# Patient Record
Sex: FEMALE | Race: ASIAN | NOT HISPANIC OR LATINO
[De-identification: names, ages, dates, MRNs, and addresses within clinical notes are randomized per-mention and may not be internally consistent; named-entity substitution may affect disease eponyms.]

---

## 2018-02-21 ENCOUNTER — CHARTING TRANS (OUTPATIENT)
Dept: OTHER | Age: 28
End: 2018-02-21

## 2018-02-21 ENCOUNTER — LAB SERVICES (OUTPATIENT)
Dept: OTHER | Age: 28
End: 2018-02-21

## 2018-02-21 LAB
HCG SERPL QL: POSITIVE
MONOCLONAL PREGNANCY: POSITIVE

## 2018-10-01 ENCOUNTER — CHARTING TRANS (OUTPATIENT)
Dept: OTHER | Age: 28
End: 2018-10-01

## 2018-10-02 ENCOUNTER — LAB SERVICES (OUTPATIENT)
Dept: OTHER | Age: 28
End: 2018-10-02

## 2018-10-02 ENCOUNTER — CHARTING TRANS (OUTPATIENT)
Dept: OTHER | Age: 28
End: 2018-10-02

## 2018-10-02 LAB
ALBUMIN SERPL-MCNC: 4 G/DL (ref 3.6–5.1)
ALBUMIN/GLOB SERPL: 1.3 (ref 1–2.4)
ALP SERPL-CCNC: 68 UNITS/L (ref 45–117)
ALT SERPL-CCNC: 17 UNITS/L
ANION GAP SERPL CALC-SCNC: 13 MMOL/L (ref 10–20)
AST SERPL-CCNC: 13 UNITS/L
BASOPHILS # BLD: 0 K/MCL (ref 0–0.3)
BASOPHILS NFR BLD: 0 %
BILIRUB SERPL-MCNC: 0.8 MG/DL (ref 0.2–1)
BUN SERPL-MCNC: 7 MG/DL (ref 6–20)
BUN/CREAT SERPL: 13 (ref 7–25)
CALCIUM SERPL-MCNC: 9.1 MG/DL (ref 8.4–10.2)
CHLORIDE SERPL-SCNC: 107 MMOL/L (ref 98–107)
CHOLEST SERPL-MCNC: 143 MG/DL
CHOLEST/HDLC SERPL: 2.7
CO2 SERPL-SCNC: 25 MMOL/L (ref 21–32)
CREAT SERPL-MCNC: 0.55 MG/DL (ref 0.51–0.95)
DIFFERENTIAL METHOD BLD: ABNORMAL
EOSINOPHIL # BLD: 0.1 K/MCL (ref 0.1–0.5)
EOSINOPHIL NFR BLD: 1 %
ERYTHROCYTE [DISTWIDTH] IN BLOOD: 12.8 % (ref 11–15)
GLOBULIN SER-MCNC: 3 G/DL (ref 2–4)
GLUCOSE SERPL-MCNC: 81 MG/DL (ref 65–99)
HDLC SERPL-MCNC: 53 MG/DL
HEMATOCRIT: 36.6 % (ref 36–46.5)
HEMOGLOBIN: 11.7 G/DL (ref 12–15.5)
IMM GRANULOCYTES # BLD AUTO: 0 K/MCL (ref 0–0.2)
IMM GRANULOCYTES NFR BLD: 0 %
LDLC SERPL CALC-MCNC: 80 MG/DL
LENGTH OF FAST TIME PATIENT: ABNORMAL HRS
LENGTH OF FAST TIME PATIENT: ABNORMAL HRS
LYMPHOCYTES # BLD: 1.3 K/MCL (ref 1–4.8)
LYMPHOCYTES NFR BLD: 27 %
MEAN CORPUSCULAR HEMOGLOBIN: 27.7 PG (ref 26–34)
MEAN CORPUSCULAR HGB CONC: 32 G/DL (ref 32–36.5)
MEAN CORPUSCULAR VOLUME: 86.7 FL (ref 78–100)
MONOCYTES # BLD: 0.5 K/MCL (ref 0.3–0.9)
MONOCYTES NFR BLD: 10 %
NEUTROPHILS # BLD: 3 K/MCL (ref 1.8–7.7)
NEUTROPHILS NFR BLD: 62 %
NONHDLC SERPL-MCNC: 90 MG/DL
NRBC (NRBCRE): 0 /100 WBC
PLATELET COUNT: 222 K/MCL (ref 140–450)
POTASSIUM SERPL-SCNC: 3.7 MMOL/L (ref 3.4–5.1)
RED CELL COUNT: 4.22 MIL/MCL (ref 4–5.2)
SODIUM SERPL-SCNC: 141 MMOL/L (ref 135–145)
TOTAL PROTEIN: 7 G/DL (ref 6.4–8.2)
TRIGL SERPL-MCNC: 50 MG/DL
WHITE BLOOD COUNT: 4.8 K/MCL (ref 4.2–11)

## 2018-10-03 LAB
APPEARANCE UR: CLEAR
BILIRUB UR QL: NEGATIVE
COLOR UR: YELLOW
GLUCOSE UR-MCNC: NEGATIVE MG/DL
KETONES UR-MCNC: NEGATIVE MG/DL
NITRITE UR QL: NEGATIVE
PH UR: 7 UNITS (ref 5–7)
PROT UR QL: NEGATIVE MG/DL
RBC-URINE: NEGATIVE
SP GR UR: 1.01 (ref 1–1.03)
SPECIMEN SOURCE: NORMAL
UROBILINOGEN UR QL: 0.2 MG/DL (ref 0–1)
WBC-URINE: NEGATIVE

## 2018-11-01 VITALS
SYSTOLIC BLOOD PRESSURE: 121 MMHG | HEIGHT: 61 IN | TEMPERATURE: 97.7 F | DIASTOLIC BLOOD PRESSURE: 75 MMHG | HEART RATE: 70 BPM | OXYGEN SATURATION: 100 % | WEIGHT: 114.64 LBS | BODY MASS INDEX: 21.64 KG/M2 | RESPIRATION RATE: 16 BRPM

## 2018-11-21 ENCOUNTER — CHARTING TRANS (OUTPATIENT)
Dept: OTHER | Age: 28
End: 2018-11-21

## 2018-11-27 VITALS
DIASTOLIC BLOOD PRESSURE: 58 MMHG | WEIGHT: 114.06 LBS | HEART RATE: 64 BPM | HEIGHT: 61 IN | BODY MASS INDEX: 21.54 KG/M2 | TEMPERATURE: 98.6 F | OXYGEN SATURATION: 98 % | SYSTOLIC BLOOD PRESSURE: 116 MMHG

## 2018-12-07 VITALS
OXYGEN SATURATION: 99 % | DIASTOLIC BLOOD PRESSURE: 72 MMHG | HEART RATE: 86 BPM | RESPIRATION RATE: 18 BRPM | WEIGHT: 114 LBS | TEMPERATURE: 98.5 F | SYSTOLIC BLOOD PRESSURE: 105 MMHG | HEIGHT: 60 IN | BODY MASS INDEX: 22.38 KG/M2

## 2019-12-20 ENCOUNTER — OFFICE VISIT (OUTPATIENT)
Dept: FAMILY MEDICINE | Age: 29
End: 2019-12-20

## 2019-12-20 DIAGNOSIS — Z78.9 VEGETARIAN DIET: ICD-10-CM

## 2019-12-20 DIAGNOSIS — Z00.00 ROUTINE HEALTH MAINTENANCE: Primary | ICD-10-CM

## 2019-12-20 DIAGNOSIS — Z12.4 CERVICAL CANCER SCREENING: ICD-10-CM

## 2019-12-20 PROBLEM — K21.9 GERD (GASTROESOPHAGEAL REFLUX DISEASE): Status: ACTIVE | Noted: 2018-10-01

## 2019-12-20 LAB
25(OH)D3+25(OH)D2 SERPL-MCNC: 12.6 NG/ML (ref 30–100)
ALBUMIN SERPL-MCNC: 4 G/DL (ref 3.6–5.1)
ALBUMIN/GLOB SERPL: 1.3 {RATIO} (ref 1–2.4)
ALP SERPL-CCNC: 66 UNITS/L (ref 45–117)
ALT SERPL-CCNC: 16 UNITS/L
ANION GAP SERPL CALC-SCNC: 10 MMOL/L (ref 10–20)
APPEARANCE UR: CLEAR
AST SERPL-CCNC: 15 UNITS/L
BACTERIA #/AREA URNS HPF: ABNORMAL /HPF
BASOPHILS # BLD AUTO: 0 K/MCL (ref 0–0.3)
BASOPHILS NFR BLD AUTO: 0 %
BILIRUB SERPL-MCNC: 0.6 MG/DL (ref 0.2–1)
BILIRUB UR QL STRIP: NEGATIVE
BUN SERPL-MCNC: 11 MG/DL (ref 6–20)
BUN/CREAT SERPL: 24 (ref 7–25)
CALCIUM SERPL-MCNC: 8.6 MG/DL (ref 8.4–10.2)
CHLORIDE SERPL-SCNC: 109 MMOL/L (ref 98–107)
CHOLEST SERPL-MCNC: 153 MG/DL
CHOLEST/HDLC SERPL: 2.5 {RATIO}
CO2 SERPL-SCNC: 26 MMOL/L (ref 21–32)
COLOR UR: YELLOW
CREAT SERPL-MCNC: 0.46 MG/DL (ref 0.51–0.95)
DIFFERENTIAL METHOD BLD: ABNORMAL
EOSINOPHIL # BLD AUTO: 0.1 K/MCL (ref 0.1–0.5)
EOSINOPHIL NFR SPEC: 1 %
ERYTHROCYTE [DISTWIDTH] IN BLOOD: 12 % (ref 11–15)
FASTING STATUS PATIENT QL REPORTED: ABNORMAL HRS
FOLATE SERPL-MCNC: 17.2 NG/ML
GLOBULIN SER-MCNC: 3.1 G/DL (ref 2–4)
GLUCOSE SERPL-MCNC: 72 MG/DL (ref 65–99)
GLUCOSE UR STRIP-MCNC: NEGATIVE MG/DL
HCT VFR BLD CALC: 35.8 % (ref 36–46.5)
HDLC SERPL-MCNC: 61 MG/DL
HGB BLD-MCNC: 11.2 G/DL (ref 12–15.5)
HGB UR QL STRIP: NEGATIVE
HYALINE CASTS #/AREA URNS LPF: ABNORMAL /LPF (ref 0–5)
IMM GRANULOCYTES # BLD AUTO: 0 K/MCL (ref 0–0.2)
IMM GRANULOCYTES NFR BLD: 0 %
KETONES UR STRIP-MCNC: NEGATIVE MG/DL
LDLC SERPL-MCNC: 84 MG/DL
LENGTH OF FAST TIME PATIENT: NORMAL HRS
LEUKOCYTE ESTERASE UR QL STRIP: NEGATIVE
LYMPHOCYTES # BLD MANUAL: 1.8 K/MCL (ref 1–4.8)
LYMPHOCYTES NFR BLD MANUAL: 29 %
MCH RBC QN AUTO: 27.6 PG (ref 26–34)
MCHC RBC AUTO-ENTMCNC: 31.3 G/DL (ref 32–36.5)
MCV RBC AUTO: 88.2 FL (ref 78–100)
MONOCYTES # BLD MANUAL: 0.4 K/MCL (ref 0.3–0.9)
MONOCYTES NFR BLD MANUAL: 7 %
MUCOUS THREADS URNS QL MICRO: PRESENT
NEUTROPHILS # BLD: 3.8 K/MCL (ref 1.8–7.7)
NEUTROPHILS NFR BLD AUTO: 63 %
NITRITE UR QL STRIP: NEGATIVE
NONHDLC SERPL-MCNC: 92 MG/DL
NRBC BLD MANUAL-RTO: 0 /100 WBC
PH UR STRIP: 8 UNITS (ref 5–7)
PLATELET # BLD: 285 K/MCL (ref 140–450)
POTASSIUM SERPL-SCNC: 3.4 MMOL/L (ref 3.4–5.1)
PROT SERPL-MCNC: 7.1 G/DL (ref 6.4–8.2)
PROT UR STRIP-MCNC: 30 MG/DL
RBC # BLD: 4.06 MIL/MCL (ref 4–5.2)
RBC #/AREA URNS HPF: ABNORMAL /HPF (ref 0–2)
SODIUM SERPL-SCNC: 142 MMOL/L (ref 135–145)
SP GR UR STRIP: 1.02 (ref 1–1.03)
SPECIMEN SOURCE: ABNORMAL
SQUAMOUS #/AREA URNS HPF: ABNORMAL /HPF (ref 0–5)
TRIGL SERPL-MCNC: 41 MG/DL
TSH SERPL-ACNC: 1.3 MCUNITS/ML (ref 0.35–5)
UROBILINOGEN UR STRIP-MCNC: 0.2 MG/DL (ref 0–1)
VIT B12 SERPL-MCNC: 169 PG/ML (ref 211–911)
WBC # BLD: 6.1 K/MCL (ref 4.2–11)
WBC #/AREA URNS HPF: ABNORMAL /HPF (ref 0–5)

## 2019-12-20 PROCEDURE — 82306 VITAMIN D 25 HYDROXY: CPT | Performed by: NURSE PRACTITIONER

## 2019-12-20 PROCEDURE — 82607 VITAMIN B-12: CPT | Performed by: NURSE PRACTITIONER

## 2019-12-20 PROCEDURE — 99395 PREV VISIT EST AGE 18-39: CPT | Performed by: NURSE PRACTITIONER

## 2019-12-20 PROCEDURE — 82746 ASSAY OF FOLIC ACID SERUM: CPT | Performed by: NURSE PRACTITIONER

## 2019-12-20 PROCEDURE — 81001 URINALYSIS AUTO W/SCOPE: CPT | Performed by: NURSE PRACTITIONER

## 2019-12-20 PROCEDURE — 80050 GENERAL HEALTH PANEL: CPT | Performed by: NURSE PRACTITIONER

## 2019-12-20 PROCEDURE — 80061 LIPID PANEL: CPT | Performed by: NURSE PRACTITIONER

## 2019-12-20 SDOH — HEALTH STABILITY: MENTAL HEALTH: HOW OFTEN DO YOU HAVE A DRINK CONTAINING ALCOHOL?: NEVER

## 2019-12-20 ASSESSMENT — PAIN SCALES - GENERAL: PAINLEVEL: 0

## 2019-12-20 ASSESSMENT — PATIENT HEALTH QUESTIONNAIRE - PHQ9
2. FEELING DOWN, DEPRESSED OR HOPELESS: NOT AT ALL
1. LITTLE INTEREST OR PLEASURE IN DOING THINGS: NOT AT ALL
SUM OF ALL RESPONSES TO PHQ9 QUESTIONS 1 AND 2: 0
SUM OF ALL RESPONSES TO PHQ9 QUESTIONS 1 AND 2: 0

## 2019-12-24 ENCOUNTER — TELEPHONE (OUTPATIENT)
Dept: FAMILY MEDICINE | Age: 29
End: 2019-12-24

## 2019-12-24 DIAGNOSIS — D64.9 ANEMIA, UNSPECIFIED TYPE: ICD-10-CM

## 2019-12-24 DIAGNOSIS — E55.9 VITAMIN D DEFICIENCY: Primary | ICD-10-CM

## 2019-12-24 DIAGNOSIS — E53.8 VITAMIN B 12 DEFICIENCY: ICD-10-CM

## 2019-12-28 LAB — HPV16+18+45 E6+E7MRNA CVX NAA+PROBE: NORMAL

## 2019-12-30 RX ORDER — FERROUS SULFATE 325(65) MG
325 TABLET ORAL
Qty: 90 TABLET | Refills: 3 | COMMUNITY
Start: 2019-12-30 | End: 2020-03-29

## 2019-12-30 RX ORDER — ERGOCALCIFEROL 1.25 MG/1
1.25 CAPSULE ORAL
Qty: 8 CAPSULE | Refills: 0 | Status: SHIPPED | OUTPATIENT
Start: 2019-12-30 | End: 2020-02-18

## 2021-05-26 VITALS
SYSTOLIC BLOOD PRESSURE: 90 MMHG | TEMPERATURE: 98.3 F | DIASTOLIC BLOOD PRESSURE: 60 MMHG | RESPIRATION RATE: 16 BRPM | BODY MASS INDEX: 22.37 KG/M2 | HEART RATE: 80 BPM | OXYGEN SATURATION: 100 % | HEIGHT: 61 IN | WEIGHT: 118.5 LBS

## 2022-07-14 LAB
AMB EXT ANTIBODY SCREEN: NEGATIVE
AMB EXT CHLAMYDIA NUCLEIC ACID AMP, TMA: NEGATIVE
AMB EXT GONOCOCCUS, NUCLEIC ACID AMP TMA: NEGATIVE
AMB EXT HBSAG SCREEN: NEGATIVE
AMB EXT HEPATITIS C VIRUS ANTIBODY: NEGATIVE
AMB EXT HIV AG AB COMBO: NON REACTIVE
AMB EXT RH FACTOR: POSITIVE
AMB EXT RPR: NON REACTIVE

## 2022-08-12 LAB
AMB EXT CYSTIC FIBROSIS ALLELE 1: NEGATIVE
AMB EXT HEMATOCRIT: 26.6
AMB EXT HEMOGLOBIN: 7.6
AMB EXT MCV: 72
AMB EXT PLATELETS: 274

## 2022-08-18 LAB
AMB EXT 1 HR GLUCOSE GESTATIONAL: 188
AMB EXT 2 HR GLUCOSE GESTATIONAL: 175
AMB EXT 3 HR GLUCOSE GESTATIONAL: 133
AMB EXT FASTING GLUCOSE GESTATIONAL: 86

## 2022-09-14 ENCOUNTER — TELEPHONE (OUTPATIENT)
Dept: OBGYN CLINIC | Facility: CLINIC | Age: 32
End: 2022-09-14

## 2022-09-14 NOTE — TELEPHONE ENCOUNTER
Pt  called to confirm, 55pgs of records were received from [facility]. Advised pt the records were just received and will be abstracted.

## 2022-09-15 ENCOUNTER — TELEPHONE (OUTPATIENT)
Dept: OBGYN CLINIC | Facility: CLINIC | Age: 32
End: 2022-09-15

## 2022-09-21 ENCOUNTER — TELEPHONE (OUTPATIENT)
Dept: OBGYN CLINIC | Facility: CLINIC | Age: 32
End: 2022-09-21

## 2022-09-21 ENCOUNTER — INITIAL PRENATAL (OUTPATIENT)
Dept: OBGYN CLINIC | Facility: CLINIC | Age: 32
End: 2022-09-21

## 2022-09-21 VITALS
HEIGHT: 61 IN | HEART RATE: 100 BPM | BODY MASS INDEX: 24.51 KG/M2 | DIASTOLIC BLOOD PRESSURE: 54 MMHG | SYSTOLIC BLOOD PRESSURE: 98 MMHG | WEIGHT: 129.81 LBS

## 2022-09-21 DIAGNOSIS — Z34.80 ENCOUNTER FOR SUPERVISION OF OTHER NORMAL PREGNANCY, UNSPECIFIED TRIMESTER: Primary | ICD-10-CM

## 2022-09-21 DIAGNOSIS — Z87.898 HISTORY OF PREDIABETES: ICD-10-CM

## 2022-09-21 LAB
BILIRUBIN: NEGATIVE
GLUCOSE (URINE DIPSTICK): NEGATIVE MG/DL
KETONES (URINE DIPSTICK): NEGATIVE MG/DL
LEUKOCYTES: NEGATIVE
MULTISTIX LOT#: NORMAL NUMERIC
NITRITE, URINE: NEGATIVE
OCCULT BLOOD: NEGATIVE
PH, URINE: 7 (ref 4.5–8)
PROTEIN (URINE DIPSTICK): NEGATIVE MG/DL
SPECIFIC GRAVITY: 1.02 (ref 1–1.03)
UROBILINOGEN,SEMI-QN: 0.2 MG/DL (ref 0–1.9)

## 2022-09-21 PROCEDURE — 3008F BODY MASS INDEX DOCD: CPT | Performed by: STUDENT IN AN ORGANIZED HEALTH CARE EDUCATION/TRAINING PROGRAM

## 2022-09-21 PROCEDURE — 3074F SYST BP LT 130 MM HG: CPT | Performed by: STUDENT IN AN ORGANIZED HEALTH CARE EDUCATION/TRAINING PROGRAM

## 2022-09-21 PROCEDURE — 81002 URINALYSIS NONAUTO W/O SCOPE: CPT | Performed by: STUDENT IN AN ORGANIZED HEALTH CARE EDUCATION/TRAINING PROGRAM

## 2022-09-21 PROCEDURE — 3078F DIAST BP <80 MM HG: CPT | Performed by: STUDENT IN AN ORGANIZED HEALTH CARE EDUCATION/TRAINING PROGRAM

## 2022-09-21 RX ORDER — MELATONIN
325
COMMUNITY

## 2022-09-21 RX ORDER — DOXYLAMINE SUCCINATE AND PYRIDOXINE HYDROCHLORIDE, DELAYED RELEASE TABLETS 10 MG/10 MG 10; 10 MG/1; MG/1
1 TABLET, DELAYED RELEASE ORAL
COMMUNITY
Start: 2022-08-12

## 2022-09-21 RX ORDER — LANCETS 30 GAUGE
1 EACH MISCELLANEOUS 4 TIMES DAILY
COMMUNITY
Start: 2022-08-22

## 2022-09-21 RX ORDER — CHOLECALCIFEROL (VITAMIN D3) 25 MCG
1 TABLET,CHEWABLE ORAL DAILY
COMMUNITY

## 2022-09-21 NOTE — TELEPHONE ENCOUNTER
Patient is 18 weeks pregnant and has been seen at the [facility] [address] location and would like to transfer to be closer to home     Please call  back [facility] [phone] to clarify

## 2022-09-21 NOTE — PROGRESS NOTES
Transfer OB - 18w0d     Transferring care from [employer]    OBhx - 1x term LTCS for arrest of descent, maternal exhaustion  PMHx - uncomplicated per pt report. Denies hx HIV, hepatitis, HSV, VTE  Last pap 2021 NILM HPV neg    27year old , EDC by LMP c/w 12wk US  -normal nuchal translucency US. Pt says NIPT was drawn at [facility] but sample lost. Offered to draw again, they do not want to repeat  -carrier screen positive for SMA, neg CF & sickle screen    1) Likely GDM  -failed early 1h then 3h GTT . Hx preDM with A1c 5.7 in July.  Pt says she does not trust the bloodwork, says ate cornflakes before 1h GTT (but 3h GTT was fasting), they had other issues with the lab, do not trust the result and want to repeat the testing -- 1h GTT ordered  -L2US ordered    2) Fe deficiency anemia  -Hgb 7.6 on  (through [facility]), ferritin low (4)  -taking PO Fe, repeat CBC ordered    3) Hx CS x1  -done for arrest of descent, pt says pushed 1h but was too exhausted to continue pushing after her long labor  -would like  trial, discussed risk uterine rupture

## 2022-09-22 NOTE — TELEPHONE ENCOUNTER
Spoke with spouse Keyla, he was made aware appts would not always be with Dr Serena Posadas, pt will rotate between the other providers and all 3 locations depending upon availability. Explained this to him 2 times. He verbalized understanding and appt was scheduled.     Future Appointments   Date Time Provider Charleen Blackmon   10/1/2022  9:15 AM PF LABTECHS PF OUTPT [facility]   10/14/2022 11:00 AM  PNORM1 [id number] [facility]   10/21/2022 12:30 PM Lance Escalera MD [facility] M [facility] [address]   11/23/2022 12:00 PM Robbie Jones MD [facility] OB[facility]N P [facility] [address]

## 2022-09-22 NOTE — TELEPHONE ENCOUNTER
Patient can transfer, but please make sure she understands that we have an office in [place] and depending the availability that she might have to schedule an appointment in this office.

## 2022-10-01 ENCOUNTER — LAB ENCOUNTER (OUTPATIENT)
Dept: LAB | Age: 32
End: 2022-10-01
Attending: STUDENT IN AN ORGANIZED HEALTH CARE EDUCATION/TRAINING PROGRAM
Payer: COMMERCIAL

## 2022-10-01 DIAGNOSIS — Z34.80 ENCOUNTER FOR SUPERVISION OF OTHER NORMAL PREGNANCY, UNSPECIFIED TRIMESTER: ICD-10-CM

## 2022-10-01 DIAGNOSIS — Z87.898 HISTORY OF PREDIABETES: ICD-10-CM

## 2022-10-01 LAB
BASOPHILS # BLD AUTO: 0.01 X10(3) UL (ref 0–0.2)
BASOPHILS NFR BLD AUTO: 0.2 %
DEPRECATED HBV CORE AB SER IA-ACNC: 5.6 NG/ML
EOSINOPHIL # BLD AUTO: 0.06 X10(3) UL (ref 0–0.7)
EOSINOPHIL NFR BLD AUTO: 0.9 %
ERYTHROCYTE [DISTWIDTH] IN BLOOD BY AUTOMATED COUNT: 22.7 %
GLUCOSE 1H P GLC SERPL-MCNC: 127 MG/DL
HCT VFR BLD AUTO: 27.9 %
HGB BLD-MCNC: 8 G/DL
IMM GRANULOCYTES # BLD AUTO: 0.05 X10(3) UL (ref 0–1)
IMM GRANULOCYTES NFR BLD: 0.8 %
IRON SATN MFR SERPL: 15 %
IRON SERPL-MCNC: 86 UG/DL
LYMPHOCYTES # BLD AUTO: 1.2 X10(3) UL (ref 1–4)
LYMPHOCYTES NFR BLD AUTO: 18.6 %
MCH RBC QN AUTO: 23.7 PG (ref 26–34)
MCHC RBC AUTO-ENTMCNC: 28.7 G/DL (ref 31–37)
MCV RBC AUTO: 82.5 FL
MONOCYTES # BLD AUTO: 0.46 X10(3) UL (ref 0.1–1)
MONOCYTES NFR BLD AUTO: 7.1 %
NEUTROPHILS # BLD AUTO: 4.67 X10 (3) UL (ref 1.5–7.7)
NEUTROPHILS # BLD AUTO: 4.67 X10(3) UL (ref 1.5–7.7)
NEUTROPHILS NFR BLD AUTO: 72.4 %
PLATELET # BLD AUTO: 168 10(3)UL (ref 150–450)
PLATELET MORPHOLOGY: NORMAL
RBC # BLD AUTO: 3.38 X10(6)UL
TIBC SERPL-MCNC: 572 UG/DL (ref 240–450)
TRANSFERRIN SERPL-MCNC: 384 MG/DL (ref 200–360)
WBC # BLD AUTO: 6.5 X10(3) UL (ref 4–11)

## 2022-10-01 PROCEDURE — 82728 ASSAY OF FERRITIN: CPT

## 2022-10-01 PROCEDURE — 36415 COLL VENOUS BLD VENIPUNCTURE: CPT

## 2022-10-01 PROCEDURE — 85025 COMPLETE CBC W/AUTO DIFF WBC: CPT

## 2022-10-01 PROCEDURE — 83550 IRON BINDING TEST: CPT

## 2022-10-01 PROCEDURE — 82950 GLUCOSE TEST: CPT

## 2022-10-01 PROCEDURE — 83540 ASSAY OF IRON: CPT

## 2022-10-14 ENCOUNTER — ULTRASOUND ENCOUNTER (OUTPATIENT)
Dept: PERINATAL CARE | Facility: HOSPITAL | Age: 32
End: 2022-10-14
Attending: OBSTETRICS & GYNECOLOGY
Payer: COMMERCIAL

## 2022-10-14 ENCOUNTER — OFFICE VISIT (OUTPATIENT)
Dept: PERINATAL CARE | Facility: HOSPITAL | Age: 32
End: 2022-10-14
Attending: STUDENT IN AN ORGANIZED HEALTH CARE EDUCATION/TRAINING PROGRAM
Payer: COMMERCIAL

## 2022-10-14 VITALS
DIASTOLIC BLOOD PRESSURE: 67 MMHG | HEART RATE: 98 BPM | BODY MASS INDEX: 25 KG/M2 | SYSTOLIC BLOOD PRESSURE: 114 MMHG | WEIGHT: 132 LBS

## 2022-10-14 DIAGNOSIS — Z87.898 HISTORY OF PREDIABETES: ICD-10-CM

## 2022-10-14 DIAGNOSIS — O35.9XX0 SUSPECTED FETAL ABNORMALITY AFFECTING MANAGEMENT OF MOTHER: Primary | ICD-10-CM

## 2022-10-14 DIAGNOSIS — Z03.75 SUSPECTED SHORTENING OF CERVIX NOT FOUND: ICD-10-CM

## 2022-10-14 DIAGNOSIS — O35.9XX0 SUSPECTED FETAL ABNORMALITY AFFECTING MANAGEMENT OF MOTHER: ICD-10-CM

## 2022-10-14 DIAGNOSIS — O35.9XX0 SUSPECTED FETAL ABNORMALITY AFFECTING MANAGEMENT OF MOTHER, SINGLE OR UNSPECIFIED FETUS: ICD-10-CM

## 2022-10-14 DIAGNOSIS — D50.9 IRON DEFICIENCY ANEMIA, UNSPECIFIED IRON DEFICIENCY ANEMIA TYPE: ICD-10-CM

## 2022-10-14 PROCEDURE — 76817 TRANSVAGINAL US OBSTETRIC: CPT

## 2022-10-14 PROCEDURE — 76811 OB US DETAILED SNGL FETUS: CPT | Performed by: OBSTETRICS & GYNECOLOGY

## 2022-10-21 ENCOUNTER — ROUTINE PRENATAL (OUTPATIENT)
Dept: OBGYN CLINIC | Facility: CLINIC | Age: 32
End: 2022-10-21
Payer: COMMERCIAL

## 2022-10-21 VITALS
HEART RATE: 105 BPM | HEIGHT: 61 IN | BODY MASS INDEX: 25.75 KG/M2 | SYSTOLIC BLOOD PRESSURE: 112 MMHG | WEIGHT: 136.38 LBS | DIASTOLIC BLOOD PRESSURE: 56 MMHG

## 2022-10-21 DIAGNOSIS — O34.219 PREGNANCY WITH HISTORY OF CESAREAN SECTION, ANTEPARTUM: ICD-10-CM

## 2022-10-21 DIAGNOSIS — O24.410 DIET CONTROLLED GESTATIONAL DIABETES MELLITUS (GDM) IN SECOND TRIMESTER: ICD-10-CM

## 2022-10-21 DIAGNOSIS — Z34.82 PRENATAL CARE, SUBSEQUENT PREGNANCY IN SECOND TRIMESTER: ICD-10-CM

## 2022-10-21 DIAGNOSIS — E55.9 VITAMIN D DEFICIENCY: ICD-10-CM

## 2022-10-21 DIAGNOSIS — R73.09 ELEVATED HEMOGLOBIN A1C: ICD-10-CM

## 2022-10-21 DIAGNOSIS — E53.8 VITAMIN B12 DEFICIENCY: ICD-10-CM

## 2022-10-21 DIAGNOSIS — O99.810 ABNORMAL GLUCOSE TOLERANCE TEST (GTT) DURING PREGNANCY, ANTEPARTUM: ICD-10-CM

## 2022-10-21 DIAGNOSIS — D50.9 MATERNAL IRON DEFICIENCY ANEMIA AFFECTING PREGNANCY IN SECOND TRIMESTER, ANTEPARTUM: Primary | ICD-10-CM

## 2022-10-21 DIAGNOSIS — O99.012 MATERNAL IRON DEFICIENCY ANEMIA AFFECTING PREGNANCY IN SECOND TRIMESTER, ANTEPARTUM: Primary | ICD-10-CM

## 2022-10-21 DIAGNOSIS — Z14.8 CARRIER OF SPINAL MUSCULAR ATROPHY: ICD-10-CM

## 2022-10-21 PROBLEM — Z13.228 SCREENING FOR CYSTIC FIBROSIS: Status: ACTIVE | Noted: 2022-08-12

## 2022-10-21 PROBLEM — Z34.92 PREGNANCY WITH PRENATAL CARE ELSEWHERE IN SECOND TRIMESTER (HCC): Status: ACTIVE | Noted: 2022-10-21

## 2022-10-21 PROBLEM — Z13.228 SCREENING FOR CYSTIC FIBROSIS: Status: RESOLVED | Noted: 2022-08-12 | Resolved: 2022-10-21

## 2022-10-21 PROBLEM — Z34.92 PREGNANCY WITH PRENATAL CARE ELSEWHERE IN SECOND TRIMESTER: Status: ACTIVE | Noted: 2022-10-21

## 2022-10-21 LAB
GLUCOSE (URINE DIPSTICK): NEGATIVE MG/DL
MULTISTIX LOT#: NORMAL NUMERIC
PROTEIN (URINE DIPSTICK): NEGATIVE MG/DL

## 2022-10-21 PROCEDURE — 3074F SYST BP LT 130 MM HG: CPT | Performed by: OBSTETRICS & GYNECOLOGY

## 2022-10-21 PROCEDURE — 3008F BODY MASS INDEX DOCD: CPT | Performed by: OBSTETRICS & GYNECOLOGY

## 2022-10-21 PROCEDURE — 3078F DIAST BP <80 MM HG: CPT | Performed by: OBSTETRICS & GYNECOLOGY

## 2022-10-21 PROCEDURE — 81002 URINALYSIS NONAUTO W/O SCOPE: CPT | Performed by: OBSTETRICS & GYNECOLOGY

## 2022-10-21 NOTE — PROGRESS NOTES
KANA    +FM. Caught cold from son. Some cough/voice is slightly hoarse. Doesn't think it is COVID. Son was negative for COVID-19 when he was tested. No cramping or bleeding. Some rib pain along the costal margin on mid to right side. Better when she sits up straight. Some itching of skin on the abdomen & breasts. 27year old  at 22w2d   WILLIAN 23 by LMP c/w 12wk US  O+    GIRL   -Flu shot - defer today. Is sick with cold. Ask at next   -Discussed 3rd trim HIV at 27+ weeks. Defer order today as patient transferring care again. 1. Transfer of care at 18w0d from [facility]  -NT ultrasound normal.   -Pt reports cfDNA was drawn at [facility] but sample lost. Declined offer to re-draw    2. Carrier SMA  -declines partner testing     3. Early onset GDM vs pre-existing DM/Prediabetes - patient does NOT accept diagnosis. -22 A1c 5.7 (H) - [facility] - indicates at least 3 months of elevated blood sugars.   -22 early 1 hr  (H) at 12w2d- [facility]  -22 early 3 hr GTT - FAILED - elevated 1 hr & 2 hr values ([facility]) => early GDM vs pre-existing DM  -Pt says she does not trust the bloodwork, says ate cornflakes before 1h GTT (but 3h GTT was fasting), they had other issues with the lab, do not trust the result and wants to repeat the testing  -10/1/22 1 hr  (normal)   -L2 US normal   -3rd trimester growth US with MFM advised   -recheck A1c now     4. SEVERE Iron deficiency anemia since 1st trimester (& h/o significant vit B12 deficiency in 2019)    - - Hb 8.4. MCV 72, Ferritin 3 ([facility])  - - Hb 7.6, MCV 72. Ferritin 4. Hb electrophoresis normal. ([facility])  -Taking oral iron   -10/1/22 Hb 8.0, ferritin 5.6   -IV iron advised was advised on 10/13/22 & again today on 10/21/22 - patient declines - wants to continue oral iron  -advise extra oral vitamin B12 as well   -recheck CBC & vit B12 level     5.  H/o  x 1 for arrest of descent  -pt reports pushed 1h but was too exhausted to continue pushing after her long labor  -would like TOLAC. Risk of uterine rupture was discussed at previous visit. 6. Vitamin D deficiency  -has been advised to take extra vitamin D    RTC -> transferring to [facility] Dr. Dhaval Caceres for next visit as she lives closer to [place].

## 2022-11-23 ENCOUNTER — INITIAL PRENATAL (OUTPATIENT)
Dept: OBGYN CLINIC | Facility: CLINIC | Age: 32
End: 2022-11-23
Payer: COMMERCIAL

## 2022-11-23 VITALS
SYSTOLIC BLOOD PRESSURE: 110 MMHG | HEART RATE: 98 BPM | HEIGHT: 61 IN | DIASTOLIC BLOOD PRESSURE: 70 MMHG | WEIGHT: 143.38 LBS | BODY MASS INDEX: 27.07 KG/M2

## 2022-11-23 DIAGNOSIS — Z34.90 ENCOUNTER FOR SUPERVISION OF NORMAL PREGNANCY, ANTEPARTUM, UNSPECIFIED GRAVIDITY: Primary | ICD-10-CM

## 2022-11-23 DIAGNOSIS — L29.9 ITCHING: ICD-10-CM

## 2022-11-23 DIAGNOSIS — Z98.891 HISTORY OF CESAREAN DELIVERY: ICD-10-CM

## 2022-11-23 PROBLEM — Z34.92 PREGNANCY WITH PRENATAL CARE ELSEWHERE IN SECOND TRIMESTER (HCC): Status: RESOLVED | Noted: 2022-10-21 | Resolved: 2022-11-23

## 2022-11-23 PROBLEM — Z34.92 PREGNANCY WITH PRENATAL CARE ELSEWHERE IN SECOND TRIMESTER: Status: RESOLVED | Noted: 2022-10-21 | Resolved: 2022-11-23

## 2022-11-23 NOTE — PATIENT INSTRUCTIONS
[address] [employer] has had significant outbreaks of pertussis (whooping cough) for the last few years. Therefore, the CDC recommends that all pregnant women receive a Tdap vaccine during pregnancy, regardless of whether you have received one previously. The vaccine reduces your chances of rustam the illness, and also provides some natural immunity to your baby for possible exposures after birth. The best time to get the vaccine is between 27 and 36 weeks. Baby caregivers (grandparents, spouse) should also receive the vaccine. Influenza- Pregnant women who develop the flu are more prone to serious complications that can affect both mother and baby. The CDC recommends that all women who will be pregnant during flu season (October to May) receive the flu vaccine (injection only, not nasal spray). The vaccine can be given during any stage of pregnancy. Both vaccines are offered in our office, as well as through many pharmacies and primary care offices. Pregnancy/Nursing and Covid-19 Vaccine   As the [facility]  begins to make progress in administering the Covid-19 vaccine, we understand that our pregnant and breastfeeding patients will have questions about the safety of the Covid-19 vaccine. Keep in mind that information is evolving rapidly and that recommendations can change over time. The CDC, the Society for Maternal Fetal Medicine, and the [facility] of Obstetrics & Gynecology now recommend that all pregnant and breastfeeding women consider getting the Covid-19 vaccine, in consultation with their healthcare provider. Factors to consider include the available safety data, risks to pregnant women from Covid-19 infection, and your individual risk for infection and severe disease, based on your risk of exposure and any other medical risk factors that you may have.   A recent study of 36,000 pregnant women confirmed the safety and effectiveness of the vaccine, with no increase risk of miscarriage. Here are some facts to consider when you are making a decision:   [1] Pregnant women are at higher risk for acquiring Covid-19 infection and have a subsequent higher risk for the following:  - severe illness  - adverse pregnancy outcomes including  birth  - hospitalization, ICU admission, need for mechanical ventilation and death  [2] The mRNA vaccines that are currently available do not contain live virus, do not enter the nucleus, do not cause genetic changes, do not alter human DNA, and cannot cause Covid-19 illness. [3] mRNA breaks down quickly, so it's unlikely that any of the mRNA in the vaccine would be able to get into your breast milk or into your baby through the placenta. [4] The antibodies that your body produces in response to the vaccine will be passed to your baby through the placenta and provide some protection for your  baby against Covid-19 infection. [5] Whether you ultimately decide to receive the vaccine or not, do not forget to continue with other preventative measures including frequent hand washing, avoiding crowds, physical distancing and wearing a mask.

## 2022-11-26 ENCOUNTER — LAB ENCOUNTER (OUTPATIENT)
Dept: LAB | Age: 32
End: 2022-11-26
Attending: OBSTETRICS & GYNECOLOGY
Payer: COMMERCIAL

## 2022-11-26 DIAGNOSIS — Z34.90 ENCOUNTER FOR SUPERVISION OF NORMAL PREGNANCY, ANTEPARTUM, UNSPECIFIED GRAVIDITY: ICD-10-CM

## 2022-11-26 DIAGNOSIS — O24.410 DIET CONTROLLED GESTATIONAL DIABETES MELLITUS (GDM) IN SECOND TRIMESTER: ICD-10-CM

## 2022-11-26 DIAGNOSIS — O99.012 MATERNAL IRON DEFICIENCY ANEMIA AFFECTING PREGNANCY IN SECOND TRIMESTER, ANTEPARTUM: ICD-10-CM

## 2022-11-26 DIAGNOSIS — O99.810 ABNORMAL GLUCOSE TOLERANCE TEST (GTT) DURING PREGNANCY, ANTEPARTUM: ICD-10-CM

## 2022-11-26 DIAGNOSIS — E53.8 VITAMIN B12 DEFICIENCY: ICD-10-CM

## 2022-11-26 DIAGNOSIS — L29.9 ITCHING: ICD-10-CM

## 2022-11-26 DIAGNOSIS — D50.9 MATERNAL IRON DEFICIENCY ANEMIA AFFECTING PREGNANCY IN SECOND TRIMESTER, ANTEPARTUM: ICD-10-CM

## 2022-11-26 PROBLEM — R79.89 ELEVATED LFTS: Status: ACTIVE | Noted: 2022-11-26

## 2022-11-26 LAB
ALBUMIN SERPL-MCNC: 2.6 G/DL (ref 3.4–5)
ALBUMIN/GLOB SERPL: 0.7 {RATIO} (ref 1–2)
ALP LIVER SERPL-CCNC: 134 U/L
ALT SERPL-CCNC: 155 U/L
ANION GAP SERPL CALC-SCNC: 9 MMOL/L (ref 0–18)
AST SERPL-CCNC: 149 U/L (ref 15–37)
BASOPHILS # BLD AUTO: 0.02 X10(3) UL (ref 0–0.2)
BASOPHILS NFR BLD AUTO: 0.3 %
BILIRUB SERPL-MCNC: 0.3 MG/DL (ref 0.1–2)
BUN BLD-MCNC: 6 MG/DL (ref 7–18)
CALCIUM BLD-MCNC: 8.5 MG/DL (ref 8.5–10.1)
CHLORIDE SERPL-SCNC: 109 MMOL/L (ref 98–112)
CO2 SERPL-SCNC: 22 MMOL/L (ref 21–32)
CREAT BLD-MCNC: 0.4 MG/DL
EOSINOPHIL # BLD AUTO: 0.08 X10(3) UL (ref 0–0.7)
EOSINOPHIL NFR BLD AUTO: 1.2 %
ERYTHROCYTE [DISTWIDTH] IN BLOOD BY AUTOMATED COUNT: 17.9 %
EST. AVERAGE GLUCOSE BLD GHB EST-MCNC: 111 MG/DL (ref 68–126)
FASTING STATUS PATIENT QL REPORTED: YES
GFR SERPLBLD BASED ON 1.73 SQ M-ARVRAT: 133 ML/MIN/1.73M2 (ref 60–?)
GLOBULIN PLAS-MCNC: 3.7 G/DL (ref 2.8–4.4)
GLUCOSE BLD-MCNC: 92 MG/DL (ref 70–99)
HBA1C MFR BLD: 5.5 % (ref ?–5.7)
HCT VFR BLD AUTO: 31.6 %
HGB BLD-MCNC: 9.6 G/DL
IMM GRANULOCYTES # BLD AUTO: 0.23 X10(3) UL (ref 0–1)
IMM GRANULOCYTES NFR BLD: 3.5 %
LYMPHOCYTES # BLD AUTO: 1.27 X10(3) UL (ref 1–4)
LYMPHOCYTES NFR BLD AUTO: 19.1 %
MCH RBC QN AUTO: 25.6 PG (ref 26–34)
MCHC RBC AUTO-ENTMCNC: 30.4 G/DL (ref 31–37)
MCV RBC AUTO: 84.3 FL
MONOCYTES # BLD AUTO: 0.44 X10(3) UL (ref 0.1–1)
MONOCYTES NFR BLD AUTO: 6.6 %
NEUTROPHILS # BLD AUTO: 4.62 X10 (3) UL (ref 1.5–7.7)
NEUTROPHILS # BLD AUTO: 4.62 X10(3) UL (ref 1.5–7.7)
NEUTROPHILS NFR BLD AUTO: 69.3 %
OSMOLALITY SERPL CALC.SUM OF ELEC: 287 MOSM/KG (ref 275–295)
PLATELET # BLD AUTO: 206 10(3)UL (ref 150–450)
POTASSIUM SERPL-SCNC: 3.9 MMOL/L (ref 3.5–5.1)
PROT SERPL-MCNC: 6.3 G/DL (ref 6.4–8.2)
RBC # BLD AUTO: 3.75 X10(6)UL
SODIUM SERPL-SCNC: 140 MMOL/L (ref 136–145)
VIT B12 SERPL-MCNC: 123 PG/ML (ref 193–986)
WBC # BLD AUTO: 6.7 X10(3) UL (ref 4–11)

## 2022-11-26 PROCEDURE — 85025 COMPLETE CBC W/AUTO DIFF WBC: CPT

## 2022-11-26 PROCEDURE — 36415 COLL VENOUS BLD VENIPUNCTURE: CPT

## 2022-11-26 PROCEDURE — 83036 HEMOGLOBIN GLYCOSYLATED A1C: CPT

## 2022-11-26 PROCEDURE — 82607 VITAMIN B-12: CPT

## 2022-11-26 PROCEDURE — 87389 HIV-1 AG W/HIV-1&-2 AB AG IA: CPT

## 2022-11-26 PROCEDURE — 82239 BILE ACIDS TOTAL: CPT

## 2022-11-26 PROCEDURE — 80053 COMPREHEN METABOLIC PANEL: CPT

## 2022-11-27 DIAGNOSIS — L29.9 ITCHING: Primary | ICD-10-CM

## 2022-11-27 PROBLEM — R73.09 ELEVATED HEMOGLOBIN A1C: Status: RESOLVED | Noted: 2022-07-14 | Resolved: 2022-11-27

## 2022-11-27 PROBLEM — O34.219 PREGNANCY WITH HISTORY OF CESAREAN SECTION, ANTEPARTUM: Status: RESOLVED | Noted: 2022-10-21 | Resolved: 2022-11-27

## 2022-11-27 PROBLEM — O34.219 PREGNANCY WITH HISTORY OF CESAREAN SECTION, ANTEPARTUM (HCC): Status: RESOLVED | Noted: 2022-10-21 | Resolved: 2022-11-27

## 2022-11-27 RX ORDER — URSODIOL 300 MG/1
300 CAPSULE ORAL 2 TIMES DAILY
Qty: 60 CAPSULE | Refills: 1 | Status: SHIPPED | OUTPATIENT
Start: 2022-11-27

## 2022-11-27 NOTE — PROGRESS NOTES
See notes   Repeat labs in 1 month  Bile acids pending  Encouraged continue PO supplementation for Hb significant improvement from 8.0

## 2022-11-27 NOTE — PROGRESS NOTES
I called patient regarding her elevated LFTs    Drawn secondary to itching and suspected cholestasis  Bile acids are pending    She reports she still has itching is actually improved. Recommend we start actigall while we are waiting bile acids.  No history of elevated LFTs    A1C was normal    We reviewed potential need for early term delivery if cholestasis is confirmed---which may complicate her desire for     Shanda Rhoades MD, 22, 5:29 PM

## 2022-11-29 LAB — BILE ACIDS, TOTAL: 8 UMOL/L

## 2022-11-30 ENCOUNTER — TELEPHONE (OUTPATIENT)
Dept: OBGYN CLINIC | Facility: CLINIC | Age: 32
End: 2022-11-30

## 2022-11-30 DIAGNOSIS — L29.9 ITCHING: Primary | ICD-10-CM

## 2022-11-30 NOTE — TELEPHONE ENCOUNTER
Labs returned and her bile acids are normal, however upper limit of normal at 8  She reports her itching is improved.    We will repeat her CMP, CBC, bile acids, uric acid, acute hepatitis panel  If still elevated without clear cause will need a liver US    Shanda Rhoades MD, 11/30/22, 5:56 PM

## 2022-12-03 ENCOUNTER — LAB ENCOUNTER (OUTPATIENT)
Dept: LAB | Age: 32
End: 2022-12-03
Attending: OBSTETRICS & GYNECOLOGY
Payer: COMMERCIAL

## 2022-12-03 DIAGNOSIS — L29.9 ITCHING: ICD-10-CM

## 2022-12-03 LAB
ALBUMIN SERPL-MCNC: 2.7 G/DL (ref 3.4–5)
ALBUMIN/GLOB SERPL: 0.8 {RATIO} (ref 1–2)
ALP LIVER SERPL-CCNC: 136 U/L
ALT SERPL-CCNC: 88 U/L
AMYLASE SERPL-CCNC: 50 U/L (ref 25–115)
ANION GAP SERPL CALC-SCNC: 8 MMOL/L (ref 0–18)
AST SERPL-CCNC: 53 U/L (ref 15–37)
BASOPHILS # BLD AUTO: 0.02 X10(3) UL (ref 0–0.2)
BASOPHILS NFR BLD AUTO: 0.3 %
BILIRUB SERPL-MCNC: 0.4 MG/DL (ref 0.1–2)
BUN BLD-MCNC: 6 MG/DL (ref 7–18)
CALCIUM BLD-MCNC: 8.8 MG/DL (ref 8.5–10.1)
CHLORIDE SERPL-SCNC: 107 MMOL/L (ref 98–112)
CO2 SERPL-SCNC: 23 MMOL/L (ref 21–32)
CREAT BLD-MCNC: 0.4 MG/DL
CREAT UR-SCNC: 91.1 MG/DL
EOSINOPHIL # BLD AUTO: 0.06 X10(3) UL (ref 0–0.7)
EOSINOPHIL NFR BLD AUTO: 0.9 %
ERYTHROCYTE [DISTWIDTH] IN BLOOD BY AUTOMATED COUNT: 18.6 %
FASTING STATUS PATIENT QL REPORTED: YES
GFR SERPLBLD BASED ON 1.73 SQ M-ARVRAT: 133 ML/MIN/1.73M2 (ref 60–?)
GLOBULIN PLAS-MCNC: 3.5 G/DL (ref 2.8–4.4)
GLUCOSE BLD-MCNC: 83 MG/DL (ref 70–99)
HAV IGM SER QL: NONREACTIVE
HBV CORE IGM SER QL: NONREACTIVE
HBV SURFACE AG SERPL QL IA: NONREACTIVE
HCT VFR BLD AUTO: 31.7 %
HCV AB SERPL QL IA: NONREACTIVE
HGB BLD-MCNC: 9.7 G/DL
IMM GRANULOCYTES # BLD AUTO: 0.08 X10(3) UL (ref 0–1)
IMM GRANULOCYTES NFR BLD: 1.2 %
LIPASE SERPL-CCNC: 61 U/L (ref 73–393)
LYMPHOCYTES # BLD AUTO: 1.14 X10(3) UL (ref 1–4)
LYMPHOCYTES NFR BLD AUTO: 16.8 %
MCH RBC QN AUTO: 26.6 PG (ref 26–34)
MCHC RBC AUTO-ENTMCNC: 30.6 G/DL (ref 31–37)
MCV RBC AUTO: 86.8 FL
MONOCYTES # BLD AUTO: 0.57 X10(3) UL (ref 0.1–1)
MONOCYTES NFR BLD AUTO: 8.4 %
NEUTROPHILS # BLD AUTO: 4.93 X10 (3) UL (ref 1.5–7.7)
NEUTROPHILS # BLD AUTO: 4.93 X10(3) UL (ref 1.5–7.7)
NEUTROPHILS NFR BLD AUTO: 72.4 %
OSMOLALITY SERPL CALC.SUM OF ELEC: 283 MOSM/KG (ref 275–295)
PLATELET # BLD AUTO: 232 10(3)UL (ref 150–450)
POTASSIUM SERPL-SCNC: 3.7 MMOL/L (ref 3.5–5.1)
PROT SERPL-MCNC: 6.2 G/DL (ref 6.4–8.2)
PROT UR-MCNC: 28 MG/DL
PROT/CREAT UR-RTO: 0.31
RBC # BLD AUTO: 3.65 X10(6)UL
SODIUM SERPL-SCNC: 138 MMOL/L (ref 136–145)
URATE SERPL-MCNC: 3.7 MG/DL
WBC # BLD AUTO: 6.8 X10(3) UL (ref 4–11)

## 2022-12-03 PROCEDURE — 82239 BILE ACIDS TOTAL: CPT

## 2022-12-03 PROCEDURE — 84550 ASSAY OF BLOOD/URIC ACID: CPT

## 2022-12-03 PROCEDURE — 82570 ASSAY OF URINE CREATININE: CPT

## 2022-12-03 PROCEDURE — 36415 COLL VENOUS BLD VENIPUNCTURE: CPT

## 2022-12-03 PROCEDURE — 80053 COMPREHEN METABOLIC PANEL: CPT

## 2022-12-03 PROCEDURE — 80074 ACUTE HEPATITIS PANEL: CPT

## 2022-12-03 PROCEDURE — 83690 ASSAY OF LIPASE: CPT

## 2022-12-03 PROCEDURE — 85025 COMPLETE CBC W/AUTO DIFF WBC: CPT

## 2022-12-03 PROCEDURE — 84156 ASSAY OF PROTEIN URINE: CPT

## 2022-12-03 PROCEDURE — 82150 ASSAY OF AMYLASE: CPT

## 2022-12-05 LAB — BILE ACIDS, TOTAL: 5 UMOL/L

## 2022-12-05 NOTE — PROGRESS NOTES
Please let patient know her LFTs are still elevated, but improved. Everything else neg so far  Neg for hepatitis  Unclear etiology  PCR slightly elevated too-->recommend 24 hour urine collection  Please order 24 hour urine protein, give instructions for collection.    Keep appt on 12/7  Still continue to hold on taking the Actigall  Thanks

## 2022-12-07 ENCOUNTER — ROUTINE PRENATAL (OUTPATIENT)
Dept: OBGYN CLINIC | Facility: CLINIC | Age: 32
End: 2022-12-07
Payer: COMMERCIAL

## 2022-12-07 ENCOUNTER — MOBILE ENCOUNTER (OUTPATIENT)
Dept: OBGYN CLINIC | Facility: CLINIC | Age: 32
End: 2022-12-07

## 2022-12-07 VITALS
DIASTOLIC BLOOD PRESSURE: 60 MMHG | SYSTOLIC BLOOD PRESSURE: 108 MMHG | HEART RATE: 95 BPM | HEIGHT: 61 IN | BODY MASS INDEX: 27.21 KG/M2 | WEIGHT: 144.13 LBS

## 2022-12-07 DIAGNOSIS — O34.219 PREGNANCY WITH HISTORY OF CESAREAN SECTION, ANTEPARTUM: ICD-10-CM

## 2022-12-07 DIAGNOSIS — R79.89 ELEVATED LFTS: ICD-10-CM

## 2022-12-07 DIAGNOSIS — L29.9 ITCHING: Primary | ICD-10-CM

## 2022-12-07 DIAGNOSIS — Z23 NEED FOR VACCINATION: ICD-10-CM

## 2022-12-07 DIAGNOSIS — D50.9 IRON DEFICIENCY ANEMIA, UNSPECIFIED IRON DEFICIENCY ANEMIA TYPE: ICD-10-CM

## 2022-12-07 DIAGNOSIS — Z3A.29 29 WEEKS GESTATION OF PREGNANCY: Primary | ICD-10-CM

## 2022-12-07 PROCEDURE — 81002 URINALYSIS NONAUTO W/O SCOPE: CPT | Performed by: OBSTETRICS & GYNECOLOGY

## 2022-12-07 PROCEDURE — 3008F BODY MASS INDEX DOCD: CPT | Performed by: OBSTETRICS & GYNECOLOGY

## 2022-12-07 PROCEDURE — 90471 IMMUNIZATION ADMIN: CPT | Performed by: OBSTETRICS & GYNECOLOGY

## 2022-12-07 PROCEDURE — 3074F SYST BP LT 130 MM HG: CPT | Performed by: OBSTETRICS & GYNECOLOGY

## 2022-12-07 PROCEDURE — 90715 TDAP VACCINE 7 YRS/> IM: CPT | Performed by: OBSTETRICS & GYNECOLOGY

## 2022-12-07 PROCEDURE — 3078F DIAST BP <80 MM HG: CPT | Performed by: OBSTETRICS & GYNECOLOGY

## 2022-12-07 NOTE — PROGRESS NOTES
Patient presents with:  Prenatal Care: KANA    Routine prenatal visit. Patient without complaints. Itching is better. Good fetal movement  Patient denies any bleeding, leaking fluid, cramping, or contractions. Assessment/Plan:  29w0d doing well  HIV done. Elevated LFT's- slightly improved on repeat with normal hepatitis panel. Check 24 hour urine. Anemia- check follow up CBC one month. Patient has started Fe supplement  Previous CS- TOLAC consent signed. RCS request sent for 40 weeks if no labor. Blood type O Pos  Reviewed vaccine recommendations: Tdap today. Kick count information given. Reviewed  labor signs and symptoms. Diagnoses and all orders for this visit:    29 weeks gestation of pregnancy  -     URINALYSIS NONAUTO W/O SCOPE (OB URISTIX)    Elevated LFTs  -     Cancel: COMP METABOLIC PANEL (14); Future    Iron deficiency anemia, unspecified iron deficiency anemia type  -     Cancel: CBC WITH DIFFERENTIAL WITH PLATELET; Future  -     CBC WITH DIFFERENTIAL WITH PLATELET; Future    Need for vaccination  -     TETANUS, DIPHTHERIA TOXOIDS AND ACELLULAR PERTUSIS VACCINE (TDAP), >7 YEARS, IM USE      Return in about 2 weeks (around 2022) for Routine Prenatal Visit.

## 2022-12-07 NOTE — PROGRESS NOTES
Contacted patient. Reported results and recommendations. Advised that we will give her supplies and instructions for urine collection at [address] today. Continue to hold off with taking medication ordered. Patient states understanding and will discuss further at appt today.   24 hr urine order placed

## 2022-12-08 ENCOUNTER — TELEPHONE (OUTPATIENT)
Dept: OBGYN CLINIC | Facility: CLINIC | Age: 32
End: 2022-12-08

## 2022-12-08 DIAGNOSIS — Z20.822 ENCOUNTER FOR PREOPERATIVE SCREENING LABORATORY TESTING FOR COVID-19 VIRUS: Primary | ICD-10-CM

## 2022-12-08 DIAGNOSIS — Z01.812 ENCOUNTER FOR PREOPERATIVE SCREENING LABORATORY TESTING FOR COVID-19 VIRUS: Primary | ICD-10-CM

## 2022-12-08 NOTE — TELEPHONE ENCOUNTER
RCS scheduled for 2/22/23 at 730am  Form faxed to L&D  Added to labor and delivery  Forwarded to RN    Sent mcm

## 2022-12-08 NOTE — TELEPHONE ENCOUNTER
----- Message from Luis Guadalupe MD sent at 2022 11:56 AM CST -----  Surgeon:     Date: 40 weeks ( on due date or next available after)    Type of Admit: Surgery Admit Inpatient    Procedure Location: L&D    PreOp Dx: Previous  section    Procedure: Repeat  section    Anesthesia: Spinal    Antibiotics: Initiate antibiotics per [facility] adult preoperative prophylactic antibiotic protocol     Pre Op Orders: Please use [employer] Orders

## 2022-12-21 ENCOUNTER — HOSPITAL ENCOUNTER (OUTPATIENT)
Facility: HOSPITAL | Age: 32
Discharge: HOME | End: 2022-12-21
Attending: OBSTETRICS & GYNECOLOGY | Admitting: OBSTETRICS & GYNECOLOGY
Payer: COMMERCIAL

## 2022-12-21 ENCOUNTER — MED REC SCAN ONLY (OUTPATIENT)
Dept: OBGYN CLINIC | Facility: CLINIC | Age: 32
End: 2022-12-21

## 2022-12-21 ENCOUNTER — ROUTINE PRENATAL (OUTPATIENT)
Dept: OBGYN CLINIC | Facility: CLINIC | Age: 32
End: 2022-12-21
Payer: COMMERCIAL

## 2022-12-21 VITALS
BODY MASS INDEX: 27.78 KG/M2 | SYSTOLIC BLOOD PRESSURE: 110 MMHG | HEIGHT: 61 IN | DIASTOLIC BLOOD PRESSURE: 60 MMHG | HEART RATE: 99 BPM | WEIGHT: 147.13 LBS

## 2022-12-21 VITALS
DIASTOLIC BLOOD PRESSURE: 61 MMHG | RESPIRATION RATE: 18 BRPM | HEART RATE: 80 BPM | SYSTOLIC BLOOD PRESSURE: 96 MMHG | TEMPERATURE: 98 F

## 2022-12-21 DIAGNOSIS — Z3A.31 31 WEEKS GESTATION OF PREGNANCY: Primary | ICD-10-CM

## 2022-12-21 LAB
GLUCOSE (URINE DIPSTICK): NEGATIVE MG/DL
MULTISTIX LOT#: NORMAL NUMERIC

## 2022-12-21 PROCEDURE — 3008F BODY MASS INDEX DOCD: CPT | Performed by: NURSE PRACTITIONER

## 2022-12-21 PROCEDURE — 59025 FETAL NON-STRESS TEST: CPT

## 2022-12-21 PROCEDURE — 99213 OFFICE O/P EST LOW 20 MIN: CPT

## 2022-12-21 PROCEDURE — 3074F SYST BP LT 130 MM HG: CPT | Performed by: NURSE PRACTITIONER

## 2022-12-21 PROCEDURE — 3078F DIAST BP <80 MM HG: CPT | Performed by: NURSE PRACTITIONER

## 2022-12-21 PROCEDURE — 81002 URINALYSIS NONAUTO W/O SCOPE: CPT | Performed by: NURSE PRACTITIONER

## 2022-12-21 NOTE — PROGRESS NOTES
Discharge instructions reviewed with patient and significant other at bedside. Verbal & written education provided and patient understands importance of keeping follow-up appointments. Next OB appointment with MFM on 12/29 @10:30AM. Kick Counts reviewed and handout provided. Patient aware that MD recommended an additional hour of monitoring, and if she has any vaginal bleeding, abdominal pain/contractions, or decreased fetal movement to return to triage. All questions/concerns addressed and clinic contact information provided.      Destini Teague RN 12/21/2022 2:46 PM

## 2022-12-21 NOTE — NST
Nonstress Test   Patient: Isai Hendricks    Gestation: 31w0d    NST:       Variability: Moderate           Accelerations: Yes           Decelerations: None            Baseline: 140 BPM           Uterine Irritability: Yes           Contractions: Not present                                        Acoustic Stimulator: No           Nonstress Test Interpretation: Appropriate for gestational age           Nonstress Test Second Interpretation: Appropriate for gestational age                     Additional Comments Comments: uterine irritability noted, no contractions/cramping felt per patient report

## 2022-12-21 NOTE — DISCHARGE INSTRUCTIONS
Discharge Instructions    Diet: Regular  Activity: Normal activity         General Instructions    Call your [facility] doctor if: Fluid leaking from your vagina;Vaginal bleeding;Uterine contractions increasing in intensity and frequency;Vaginal or rectal pressure;Uterine contractions 10 minutes or closer for 1 to 2 hours;Decrease in fetal movement; Temperature greater than 100F

## 2022-12-21 NOTE — PROGRESS NOTES
Pt is a 27year old female admitted to TRG2/TRG2-A. Patient presents with:   Assessment: S/p fall today at [facility] when stepping onto/off of scale. Fell onto buttocks, denies hitting abdomen. Denies any abdominal pain, no contractions/cramping. Denies any vaginal leaking or bleeding. +FM. Pt is  31w0d intra-uterine pregnancy. History obtained, consents signed. Oriented to room, staff, and plan of care. EFM tested and applied. Abdomen soft and non-tender. Active fetal movement per patient report.

## 2022-12-21 NOTE — PROGRESS NOTES
KANA  Doing well, no concerns  Patient had a witnessed fall by MD in office  She was recommended to proceed to L&D for monitoring  She denies any pain at this time, normal fetal movement  GOOD FM  Denies VB/LOF/uctx  RTC in 2 wks  Fetal movement discussed

## 2022-12-29 ENCOUNTER — OFFICE VISIT (OUTPATIENT)
Dept: PERINATAL CARE | Facility: HOSPITAL | Age: 32
End: 2022-12-29
Attending: OBSTETRICS & GYNECOLOGY
Payer: COMMERCIAL

## 2022-12-29 ENCOUNTER — TELEPHONE (OUTPATIENT)
Dept: OBGYN CLINIC | Facility: CLINIC | Age: 32
End: 2022-12-29

## 2022-12-29 VITALS
HEIGHT: 61 IN | WEIGHT: 150 LBS | SYSTOLIC BLOOD PRESSURE: 107 MMHG | BODY MASS INDEX: 28.32 KG/M2 | DIASTOLIC BLOOD PRESSURE: 68 MMHG | HEART RATE: 96 BPM

## 2022-12-29 DIAGNOSIS — R79.89 ELEVATED LFTS: ICD-10-CM

## 2022-12-29 DIAGNOSIS — Z87.898 HISTORY OF PREDIABETES: Primary | ICD-10-CM

## 2022-12-29 DIAGNOSIS — Z87.898 HISTORY OF PREDIABETES: ICD-10-CM

## 2022-12-29 PROCEDURE — 76819 FETAL BIOPHYS PROFIL W/O NST: CPT

## 2022-12-29 PROCEDURE — 76816 OB US FOLLOW-UP PER FETUS: CPT | Performed by: OBSTETRICS & GYNECOLOGY

## 2022-12-29 NOTE — TELEPHONE ENCOUNTER
Pt dropped off [facility] paperwork to be completed.  will call to make payment.  Forms are at desk in [address]

## 2022-12-30 ENCOUNTER — TELEPHONE (OUTPATIENT)
Dept: OBGYN CLINIC | Facility: CLINIC | Age: 32
End: 2022-12-30

## 2022-12-30 DIAGNOSIS — R79.89 ELEVATED LFTS: Primary | ICD-10-CM

## 2022-12-30 NOTE — TELEPHONE ENCOUNTER
Dr. Bobby Charles:    Received MFM notes   They recommend repeat CBC, CMP and protein/creatinine ratio   Please order and inform pt to have this done   Thank you

## 2022-12-30 NOTE — TELEPHONE ENCOUNTER
CBC, 24 hour urine creatinine/protein orders already on file. CMP and Urine protein/creatinine ordered. Called patient; no answer. Unable to leave a message -VM not set up.

## 2023-01-04 ENCOUNTER — ROUTINE PRENATAL (OUTPATIENT)
Dept: OBGYN CLINIC | Facility: CLINIC | Age: 33
End: 2023-01-04
Payer: COMMERCIAL

## 2023-01-04 VITALS — WEIGHT: 151.13 LBS | DIASTOLIC BLOOD PRESSURE: 62 MMHG | BODY MASS INDEX: 29 KG/M2 | SYSTOLIC BLOOD PRESSURE: 100 MMHG

## 2023-01-04 DIAGNOSIS — O24.410 DIET CONTROLLED GESTATIONAL DIABETES MELLITUS (GDM) IN SECOND TRIMESTER: ICD-10-CM

## 2023-01-04 DIAGNOSIS — Z3A.33 33 WEEKS GESTATION OF PREGNANCY: Primary | ICD-10-CM

## 2023-01-04 DIAGNOSIS — Z14.8 CARRIER OF SPINAL MUSCULAR ATROPHY: ICD-10-CM

## 2023-01-04 DIAGNOSIS — R79.89 ELEVATED LFTS: ICD-10-CM

## 2023-01-04 DIAGNOSIS — D50.9 IRON DEFICIENCY ANEMIA, UNSPECIFIED IRON DEFICIENCY ANEMIA TYPE: ICD-10-CM

## 2023-01-04 DIAGNOSIS — Z98.891 HISTORY OF CESAREAN DELIVERY: ICD-10-CM

## 2023-01-04 LAB
CREAT UR-SCNC: 21.4 MG/DL
GLUCOSE (URINE DIPSTICK): NEGATIVE MG/DL
MULTISTIX LOT#: NORMAL NUMERIC
PROT UR-MCNC: <5 MG/DL
PROTEIN (URINE DIPSTICK): NEGATIVE MG/DL

## 2023-01-04 PROCEDURE — 82570 ASSAY OF URINE CREATININE: CPT | Performed by: OBSTETRICS & GYNECOLOGY

## 2023-01-04 PROCEDURE — 84156 ASSAY OF PROTEIN URINE: CPT | Performed by: OBSTETRICS & GYNECOLOGY

## 2023-01-04 PROCEDURE — 81002 URINALYSIS NONAUTO W/O SCOPE: CPT | Performed by: OBSTETRICS & GYNECOLOGY

## 2023-01-04 PROCEDURE — 3074F SYST BP LT 130 MM HG: CPT | Performed by: OBSTETRICS & GYNECOLOGY

## 2023-01-04 PROCEDURE — 3078F DIAST BP <80 MM HG: CPT | Performed by: OBSTETRICS & GYNECOLOGY

## 2023-01-04 NOTE — PROGRESS NOTES
KANA  [id number]  GA: 33w0d   23  1226   BP: 100/62   Weight: 151 lb 2 oz (68.5 kg)       Doing well, +FM  Denies LOF/VB/uctx  Rh +, TDAP received, EPDS - needs to be redone   PTL and Fetal movement instructions given  Recommend for patient to repeat CBC and CMP, order with instructions provided. Protein creatinine ratio sent today. Patient denies N, V, Ha, vision changes and RUQ/Epigastric pain. .  Denies general pruritus and denies pruritus along the soles of her feet or the palms of her hands. Problem List Items Addressed This Visit        Endocrine and Metabolic    Diet controlled gestational diabetes mellitus (GDM) in second trimester       Gastrointestinal and Abdominal    Elevated LFTs       Gravid and     History of  delivery       Hematology and Neoplasia    Iron deficiency anemia       Chromosomal and Congenital    Carrier of spinal muscular atrophy   Other Visit Diagnoses     33 weeks gestation of pregnancy    -  Primary    Relevant Orders    URINALYSIS NONAUTO W/O SCOPE (OB URISTIX) (Completed)          RTC in 2 wks      Note to patient and family   The [facility] makes medical notes available to patients in the interest of transparency. However, please be advised that this is a medical document. It is intended as [id number] communication. It is written and medical language may contain abbreviations or verbiage that are technical and unfamiliar. It may appear blunt or direct. Medical documents are intended to carry relevant information, facts as evident, and the clinical opinion of the practitioner.

## 2023-01-06 NOTE — TELEPHONE ENCOUNTER
FMLA papers completed and signed  Copy in [place]  Copy sent to scanning      Patient would like papers email to her and a copy sent to [employer]. She was notified that she can  papers some time next week since the office is closing in 15mins. Papers sent to email:  [email or web address]

## 2023-01-07 ENCOUNTER — LAB ENCOUNTER (OUTPATIENT)
Dept: LAB | Facility: HOSPITAL | Age: 33
End: 2023-01-07
Attending: OBSTETRICS & GYNECOLOGY
Payer: COMMERCIAL

## 2023-01-07 DIAGNOSIS — R79.89 ELEVATED LFTS: ICD-10-CM

## 2023-01-07 DIAGNOSIS — D50.9 IRON DEFICIENCY ANEMIA, UNSPECIFIED IRON DEFICIENCY ANEMIA TYPE: ICD-10-CM

## 2023-01-07 LAB
ALBUMIN SERPL-MCNC: 2.6 G/DL (ref 3.4–5)
ALBUMIN/GLOB SERPL: 0.7 {RATIO} (ref 1–2)
ALP LIVER SERPL-CCNC: 139 U/L
ALT SERPL-CCNC: 29 U/L
ANION GAP SERPL CALC-SCNC: 3 MMOL/L (ref 0–18)
AST SERPL-CCNC: 24 U/L (ref 15–37)
BASOPHILS # BLD AUTO: 0.02 X10(3) UL (ref 0–0.2)
BASOPHILS NFR BLD AUTO: 0.3 %
BILIRUB SERPL-MCNC: 0.3 MG/DL (ref 0.1–2)
BUN BLD-MCNC: 5 MG/DL (ref 7–18)
CALCIUM BLD-MCNC: 8.5 MG/DL (ref 8.5–10.1)
CHLORIDE SERPL-SCNC: 108 MMOL/L (ref 98–112)
CO2 SERPL-SCNC: 25 MMOL/L (ref 21–32)
CREAT BLD-MCNC: 0.44 MG/DL
EOSINOPHIL # BLD AUTO: 0.08 X10(3) UL (ref 0–0.7)
EOSINOPHIL NFR BLD AUTO: 1.3 %
ERYTHROCYTE [DISTWIDTH] IN BLOOD BY AUTOMATED COUNT: 17.2 %
FASTING STATUS PATIENT QL REPORTED: YES
GFR SERPLBLD BASED ON 1.73 SQ M-ARVRAT: 130 ML/MIN/1.73M2 (ref 60–?)
GLOBULIN PLAS-MCNC: 4 G/DL (ref 2.8–4.4)
GLUCOSE BLD-MCNC: 88 MG/DL (ref 70–99)
HCT VFR BLD AUTO: 32.2 %
HGB BLD-MCNC: 10.3 G/DL
IMM GRANULOCYTES # BLD AUTO: 0.07 X10(3) UL (ref 0–1)
IMM GRANULOCYTES NFR BLD: 1.1 %
LYMPHOCYTES # BLD AUTO: 1.12 X10(3) UL (ref 1–4)
LYMPHOCYTES NFR BLD AUTO: 18.3 %
MCH RBC QN AUTO: 27 PG (ref 26–34)
MCHC RBC AUTO-ENTMCNC: 32 G/DL (ref 31–37)
MCV RBC AUTO: 84.5 FL
MONOCYTES # BLD AUTO: 0.54 X10(3) UL (ref 0.1–1)
MONOCYTES NFR BLD AUTO: 8.8 %
NEUTROPHILS # BLD AUTO: 4.28 X10 (3) UL (ref 1.5–7.7)
NEUTROPHILS # BLD AUTO: 4.28 X10(3) UL (ref 1.5–7.7)
NEUTROPHILS NFR BLD AUTO: 70.2 %
OSMOLALITY SERPL CALC.SUM OF ELEC: 279 MOSM/KG (ref 275–295)
PLATELET # BLD AUTO: 186 10(3)UL (ref 150–450)
POTASSIUM SERPL-SCNC: 3.6 MMOL/L (ref 3.5–5.1)
PROT SERPL-MCNC: 6.6 G/DL (ref 6.4–8.2)
RBC # BLD AUTO: 3.81 X10(6)UL
SODIUM SERPL-SCNC: 136 MMOL/L (ref 136–145)
WBC # BLD AUTO: 6.1 X10(3) UL (ref 4–11)

## 2023-01-07 PROCEDURE — 36415 COLL VENOUS BLD VENIPUNCTURE: CPT

## 2023-01-07 PROCEDURE — 80053 COMPREHEN METABOLIC PANEL: CPT

## 2023-01-07 PROCEDURE — 85025 COMPLETE CBC W/AUTO DIFF WBC: CPT

## 2023-01-15 PROBLEM — Z34.82 PRENATAL CARE, SUBSEQUENT PREGNANCY IN SECOND TRIMESTER (HCC): Status: RESOLVED | Noted: 2022-10-21 | Resolved: 2023-01-15

## 2023-01-15 PROBLEM — Z34.82 PRENATAL CARE, SUBSEQUENT PREGNANCY IN SECOND TRIMESTER: Status: RESOLVED | Noted: 2022-10-21 | Resolved: 2023-01-15

## 2023-01-16 ENCOUNTER — ROUTINE PRENATAL (OUTPATIENT)
Dept: OBGYN CLINIC | Facility: CLINIC | Age: 33
End: 2023-01-16
Payer: COMMERCIAL

## 2023-01-16 VITALS
DIASTOLIC BLOOD PRESSURE: 60 MMHG | HEIGHT: 61 IN | BODY MASS INDEX: 28.15 KG/M2 | HEART RATE: 98 BPM | SYSTOLIC BLOOD PRESSURE: 100 MMHG | WEIGHT: 149.13 LBS

## 2023-01-16 DIAGNOSIS — Z3A.34 34 WEEKS GESTATION OF PREGNANCY: Primary | ICD-10-CM

## 2023-01-16 LAB
GLUCOSE (URINE DIPSTICK): NEGATIVE MG/DL
MULTISTIX LOT#: ABNORMAL NUMERIC
PROTEIN (URINE DIPSTICK): 30 MG/DL

## 2023-01-16 NOTE — PROGRESS NOTES
Patient presents with:  Prenatal Care: KANA--yes yes student     Routine prenatal visit without complaints. Itching resolved. Patient denies any bleeding, leaking fluid, cramping, or regular uterine contractions. Good fetal movement. Assessment/Plan:  34w5d doing well  Kick counts reminded  TOLAC until scheduled CS date 23  Reviewed  labor signs and symptoms. Reviewed vaccine recommendations: Tdap done. Diagnoses and all orders for this visit:    34 weeks gestation of pregnancy  -     URINALYSIS NONAUTO W/O SCOPE (OB URISTIX)       Return in about 2 weeks (around 2023) for Routine Prenatal Visit, MARK.

## 2023-02-01 ENCOUNTER — ROUTINE PRENATAL (OUTPATIENT)
Dept: OBGYN CLINIC | Facility: CLINIC | Age: 33
End: 2023-02-01
Payer: COMMERCIAL

## 2023-02-01 VITALS
WEIGHT: 154.38 LBS | DIASTOLIC BLOOD PRESSURE: 60 MMHG | SYSTOLIC BLOOD PRESSURE: 108 MMHG | BODY MASS INDEX: 29 KG/M2 | HEART RATE: 102 BPM

## 2023-02-01 DIAGNOSIS — Z98.891 HISTORY OF CESAREAN DELIVERY: Primary | ICD-10-CM

## 2023-02-01 DIAGNOSIS — Z3A.37 37 WEEKS GESTATION OF PREGNANCY: ICD-10-CM

## 2023-02-01 PROCEDURE — 81002 URINALYSIS NONAUTO W/O SCOPE: CPT | Performed by: OBSTETRICS & GYNECOLOGY

## 2023-02-01 PROCEDURE — 87081 CULTURE SCREEN ONLY: CPT | Performed by: OBSTETRICS & GYNECOLOGY

## 2023-02-01 PROCEDURE — 87653 STREP B DNA AMP PROBE: CPT | Performed by: OBSTETRICS & GYNECOLOGY

## 2023-02-01 PROCEDURE — 3078F DIAST BP <80 MM HG: CPT | Performed by: OBSTETRICS & GYNECOLOGY

## 2023-02-01 PROCEDURE — 3074F SYST BP LT 130 MM HG: CPT | Performed by: OBSTETRICS & GYNECOLOGY

## 2023-02-03 LAB — GROUP B STREP BY PCR FOR PCR OVT: NEGATIVE

## 2023-02-06 ENCOUNTER — TELEPHONE (OUTPATIENT)
Dept: OBGYN CLINIC | Facility: CLINIC | Age: 33
End: 2023-02-06

## 2023-02-09 ENCOUNTER — ROUTINE PRENATAL (OUTPATIENT)
Dept: OBGYN CLINIC | Facility: CLINIC | Age: 33
End: 2023-02-09
Payer: COMMERCIAL

## 2023-02-09 VITALS
DIASTOLIC BLOOD PRESSURE: 60 MMHG | SYSTOLIC BLOOD PRESSURE: 110 MMHG | BODY MASS INDEX: 29 KG/M2 | HEART RATE: 90 BPM | WEIGHT: 154.63 LBS

## 2023-02-09 DIAGNOSIS — Z3A.38 38 WEEKS GESTATION OF PREGNANCY: Primary | ICD-10-CM

## 2023-02-09 PROCEDURE — 3078F DIAST BP <80 MM HG: CPT | Performed by: NURSE PRACTITIONER

## 2023-02-09 PROCEDURE — 3074F SYST BP LT 130 MM HG: CPT | Performed by: NURSE PRACTITIONER

## 2023-02-09 PROCEDURE — 81002 URINALYSIS NONAUTO W/O SCOPE: CPT | Performed by: NURSE PRACTITIONER

## 2023-02-09 NOTE — PROGRESS NOTES
KANA  Doing well, +FM  Denies VB/LOF/uctx  Mode of delivery: RCS scheduled  Labor precautions discussed  SVE declined  RTC 1 week

## 2023-02-13 ENCOUNTER — ROUTINE PRENATAL (OUTPATIENT)
Dept: OBGYN CLINIC | Facility: CLINIC | Age: 33
End: 2023-02-13
Payer: COMMERCIAL

## 2023-02-13 VITALS
DIASTOLIC BLOOD PRESSURE: 70 MMHG | SYSTOLIC BLOOD PRESSURE: 110 MMHG | HEIGHT: 61 IN | BODY MASS INDEX: 28.89 KG/M2 | HEART RATE: 80 BPM | WEIGHT: 153 LBS

## 2023-02-13 DIAGNOSIS — Z36.9 PRENATAL SCREENING ENCOUNTER: Primary | ICD-10-CM

## 2023-02-13 PROCEDURE — 3008F BODY MASS INDEX DOCD: CPT | Performed by: OBSTETRICS & GYNECOLOGY

## 2023-02-13 PROCEDURE — 3074F SYST BP LT 130 MM HG: CPT | Performed by: OBSTETRICS & GYNECOLOGY

## 2023-02-13 PROCEDURE — 3078F DIAST BP <80 MM HG: CPT | Performed by: OBSTETRICS & GYNECOLOGY

## 2023-02-13 PROCEDURE — 81002 URINALYSIS NONAUTO W/O SCOPE: CPT | Performed by: OBSTETRICS & GYNECOLOGY

## 2023-02-13 NOTE — PROGRESS NOTES
Patient presents with:  Prenatal Care: KANA    Routine prenatal visit without complaints. Patient denies any bleeding, leaking fluid, cramping, or regular uterine contractions. Good fetal movement. Assessment/Plan:  38w5d doing well  GBS neg  TOLAC- CS scheduled 2/22/23 if no labor by then  Kick counts reviewed. Reviewed labor signs and symptoms. Diagnoses and all orders for this visit:    Prenatal screening encounter  -     URINALYSIS NONAUTO W/O SCOPE (OB URISTIX)       Return in about 1 week (around 2/20/2023) for Routine Prenatal Visit.

## 2023-02-16 ENCOUNTER — TELEPHONE (OUTPATIENT)
Dept: OBGYN UNIT | Facility: HOSPITAL | Age: 33
End: 2023-02-16

## 2023-02-16 NOTE — PROGRESS NOTES
Attempted preadmission phone call. Unable to leave message for pt, as the voicemail box isn't set up.

## 2023-02-19 ENCOUNTER — LAB ENCOUNTER (OUTPATIENT)
Dept: LAB | Facility: HOSPITAL | Age: 33
End: 2023-02-19
Attending: OBSTETRICS & GYNECOLOGY
Payer: COMMERCIAL

## 2023-02-19 DIAGNOSIS — Z01.812 ENCOUNTER FOR PREOPERATIVE SCREENING LABORATORY TESTING FOR COVID-19 VIRUS: ICD-10-CM

## 2023-02-19 DIAGNOSIS — Z20.822 ENCOUNTER FOR PREOPERATIVE SCREENING LABORATORY TESTING FOR COVID-19 VIRUS: ICD-10-CM

## 2023-02-20 ENCOUNTER — ROUTINE PRENATAL (OUTPATIENT)
Dept: OBGYN CLINIC | Facility: CLINIC | Age: 33
End: 2023-02-20
Payer: COMMERCIAL

## 2023-02-20 VITALS
SYSTOLIC BLOOD PRESSURE: 112 MMHG | WEIGHT: 157 LBS | BODY MASS INDEX: 30 KG/M2 | HEART RATE: 99 BPM | DIASTOLIC BLOOD PRESSURE: 70 MMHG

## 2023-02-20 DIAGNOSIS — Z3A.39 39 WEEKS GESTATION OF PREGNANCY: Primary | ICD-10-CM

## 2023-02-20 LAB
GLUCOSE (URINE DIPSTICK): NEGATIVE MG/DL
MULTISTIX LOT#: NORMAL NUMERIC
PROTEIN (URINE DIPSTICK): NEGATIVE MG/DL
SARS-COV-2 RNA RESP QL NAA+PROBE: NOT DETECTED

## 2023-02-20 NOTE — PATIENT INSTRUCTIONS
Pediatrician/Family Practice Referral    Pediatricians:    [phone]  Karena [address]    [facility] [address] [facility],  [address]  [place], [address] [facility]    [address] [facility], [facility]    [facility]  [facility] [address]  [place], [facility] 21        Family Practice:    Dr. Nasrin Olvera and Dr. Mayra Godinez [id number]  [place] Mc[place]on  [phone]    Dr. Courtney Aguillon and Dr. Maryjo Hernandez  [phone]    Dr. Delaney Ernst Hugo Ready    Dr. Santana [facility]  [place] [address], [address] [facility]    Dr. Trey Bailey  [address] [facility].  [address] [facility]    Dr. Skyla Sampson  [facility], [address] [facility]    Dr. Theo Mckeon  [address] [facility]  [place], [address] [facility]    Dr. Fran Herman  [facility] 37  [address]man [address]

## 2023-02-20 NOTE — PROGRESS NOTES
Patient presents with:  Prenatal Care: KANA    Routine prenatal visit without complaints. Patient denies any bleeding, leaking fluid, cramping, or regular uterine contractions. Good fetal movement. Assessment/Plan:  39w5d doing well  GBS neg  RCS 2/22/23 if no labor. Kick counts reviewed. Reviewed labor signs and symptoms. Diagnoses and all orders for this visit:    39 weeks gestation of pregnancy  -     URINALYSIS NONAUTO W/O SCOPE (OB URISTIX)       Return in about 2 weeks (around 3/6/2023) for Post Operative Visit.

## 2023-02-21 ENCOUNTER — TELEPHONE (OUTPATIENT)
Dept: OBGYN UNIT | Facility: HOSPITAL | Age: 33
End: 2023-02-21

## 2023-02-21 ENCOUNTER — ANESTHESIA EVENT (OUTPATIENT)
Dept: OBGYN UNIT | Facility: HOSPITAL | Age: 33
End: 2023-02-21
Payer: COMMERCIAL

## 2023-02-21 NOTE — PROGRESS NOTES
Patient given arrival instructions. RN reviewed procedure and plan of care. All questions answered. Patient verbalizes understanding.

## 2023-02-22 ENCOUNTER — HOSPITAL ENCOUNTER (INPATIENT)
Facility: HOSPITAL | Age: 33
LOS: 3 days | Discharge: HOME | End: 2023-02-25
Attending: OBSTETRICS & GYNECOLOGY | Admitting: OBSTETRICS & GYNECOLOGY
Payer: COMMERCIAL

## 2023-02-22 ENCOUNTER — ANESTHESIA (OUTPATIENT)
Dept: OBGYN UNIT | Facility: HOSPITAL | Age: 33
End: 2023-02-22
Payer: COMMERCIAL

## 2023-02-22 PROBLEM — Z34.90 PREGNANCY: Status: RESOLVED | Noted: 2023-02-22 | Resolved: 2023-02-22

## 2023-02-22 PROBLEM — Z98.891 STATUS POST CESAREAN SECTION: Status: ACTIVE | Noted: 2023-02-22

## 2023-02-22 PROBLEM — Z34.90 PREGNANCY (HCC): Status: ACTIVE | Noted: 2023-02-22

## 2023-02-22 PROBLEM — Z34.90 PREGNANCY: Status: ACTIVE | Noted: 2023-02-22

## 2023-02-22 PROBLEM — Z34.90 PREGNANCY (HCC): Status: RESOLVED | Noted: 2023-02-22 | Resolved: 2023-02-22

## 2023-02-22 LAB
ANTIBODY SCREEN: NEGATIVE
BASOPHILS # BLD AUTO: 0.01 X10(3) UL (ref 0–0.2)
BASOPHILS NFR BLD AUTO: 0.2 %
EOSINOPHIL # BLD AUTO: 0.06 X10(3) UL (ref 0–0.7)
EOSINOPHIL NFR BLD AUTO: 1.1 %
ERYTHROCYTE [DISTWIDTH] IN BLOOD BY AUTOMATED COUNT: 15.9 %
GLUCOSE BLD-MCNC: 105 MG/DL (ref 70–99)
GLUCOSE BLD-MCNC: 99 MG/DL (ref 70–99)
HCT VFR BLD AUTO: 39.4 %
HGB BLD-MCNC: 12.1 G/DL
IMM GRANULOCYTES # BLD AUTO: 0.17 X10(3) UL (ref 0–1)
IMM GRANULOCYTES NFR BLD: 3.2 %
LYMPHOCYTES # BLD AUTO: 0.77 X10(3) UL (ref 1–4)
LYMPHOCYTES NFR BLD AUTO: 14.3 %
MCH RBC QN AUTO: 26.7 PG (ref 26–34)
MCHC RBC AUTO-ENTMCNC: 30.7 G/DL (ref 31–37)
MCV RBC AUTO: 86.8 FL
MONOCYTES # BLD AUTO: 0.52 X10(3) UL (ref 0.1–1)
MONOCYTES NFR BLD AUTO: 9.7 %
NEUTROPHILS # BLD AUTO: 3.84 X10 (3) UL (ref 1.5–7.7)
NEUTROPHILS # BLD AUTO: 3.84 X10(3) UL (ref 1.5–7.7)
NEUTROPHILS NFR BLD AUTO: 71.5 %
PLATELET # BLD AUTO: 125 10(3)UL (ref 150–450)
RBC # BLD AUTO: 4.54 X10(6)UL
RH BLOOD TYPE: POSITIVE
T PALLIDUM AB SER QL IA: NONREACTIVE
WBC # BLD AUTO: 5.4 X10(3) UL (ref 4–11)

## 2023-02-22 PROCEDURE — 59515 CESAREAN DELIVERY: CPT | Performed by: OBSTETRICS & GYNECOLOGY

## 2023-02-22 RX ORDER — NICOTINE POLACRILEX 4 MG
15 LOZENGE BUCCAL
Status: DISCONTINUED | OUTPATIENT
Start: 2023-02-22 | End: 2023-02-22 | Stop reason: HOSPADM

## 2023-02-22 RX ORDER — BUPIVACAINE HYDROCHLORIDE 7.5 MG/ML
INJECTION, SOLUTION INTRASPINAL AS NEEDED
Status: DISCONTINUED | OUTPATIENT
Start: 2023-02-22 | End: 2023-02-22 | Stop reason: SURG

## 2023-02-22 RX ORDER — SODIUM CHLORIDE, SODIUM LACTATE, POTASSIUM CHLORIDE, CALCIUM CHLORIDE 600; 310; 30; 20 MG/100ML; MG/100ML; MG/100ML; MG/100ML
INJECTION, SOLUTION INTRAVENOUS CONTINUOUS PRN
Status: DISCONTINUED | OUTPATIENT
Start: 2023-02-22 | End: 2023-02-22 | Stop reason: SURG

## 2023-02-22 RX ORDER — NICOTINE POLACRILEX 4 MG
30 LOZENGE BUCCAL
Status: DISCONTINUED | OUTPATIENT
Start: 2023-02-22 | End: 2023-02-22 | Stop reason: HOSPADM

## 2023-02-22 RX ORDER — KETOROLAC TROMETHAMINE 30 MG/ML
30 INJECTION, SOLUTION INTRAMUSCULAR; INTRAVENOUS ONCE AS NEEDED
Status: DISCONTINUED | OUTPATIENT
Start: 2023-02-22 | End: 2023-02-22 | Stop reason: HOSPADM

## 2023-02-22 RX ORDER — ACETAMINOPHEN 500 MG
1000 TABLET ORAL ONCE
Status: COMPLETED | OUTPATIENT
Start: 2023-02-22 | End: 2023-02-22

## 2023-02-22 RX ORDER — OXYCODONE HYDROCHLORIDE 5 MG/1
5 TABLET ORAL EVERY 6 HOURS PRN
Status: DISCONTINUED | OUTPATIENT
Start: 2023-02-22 | End: 2023-02-25

## 2023-02-22 RX ORDER — NALBUPHINE HCL 10 MG/ML
2.5 AMPUL (ML) INJECTION
Status: DISCONTINUED | OUTPATIENT
Start: 2023-02-22 | End: 2023-02-22

## 2023-02-22 RX ORDER — ONDANSETRON 2 MG/ML
4 INJECTION INTRAMUSCULAR; INTRAVENOUS ONCE AS NEEDED
Status: DISCONTINUED | OUTPATIENT
Start: 2023-02-22 | End: 2023-02-22 | Stop reason: HOSPADM

## 2023-02-22 RX ORDER — ACETAMINOPHEN 500 MG
1000 TABLET ORAL EVERY 6 HOURS
Status: DISCONTINUED | OUTPATIENT
Start: 2023-02-22 | End: 2023-02-25

## 2023-02-22 RX ORDER — ONDANSETRON 2 MG/ML
4 INJECTION INTRAMUSCULAR; INTRAVENOUS EVERY 6 HOURS PRN
Status: DISCONTINUED | OUTPATIENT
Start: 2023-02-22 | End: 2023-02-25

## 2023-02-22 RX ORDER — PHENYLEPHRINE HCL 10 MG/ML
VIAL (ML) INJECTION AS NEEDED
Status: DISCONTINUED | OUTPATIENT
Start: 2023-02-22 | End: 2023-02-22 | Stop reason: SURG

## 2023-02-22 RX ORDER — CEFAZOLIN SODIUM/WATER 2 G/20 ML
2 SYRINGE (ML) INTRAVENOUS ONCE
Status: COMPLETED | OUTPATIENT
Start: 2023-02-22 | End: 2023-02-22

## 2023-02-22 RX ORDER — DOCUSATE SODIUM 100 MG/1
100 CAPSULE, LIQUID FILLED ORAL
Status: DISCONTINUED | OUTPATIENT
Start: 2023-02-22 | End: 2023-02-25

## 2023-02-22 RX ORDER — TRISODIUM CITRATE DIHYDRATE AND CITRIC ACID MONOHYDRATE 500; 334 MG/5ML; MG/5ML
30 SOLUTION ORAL ONCE
Status: DISCONTINUED | OUTPATIENT
Start: 2023-02-22 | End: 2023-02-22 | Stop reason: HOSPADM

## 2023-02-22 RX ORDER — DEXTROSE MONOHYDRATE 25 G/50ML
50 INJECTION, SOLUTION INTRAVENOUS
Status: DISCONTINUED | OUTPATIENT
Start: 2023-02-22 | End: 2023-02-22 | Stop reason: HOSPADM

## 2023-02-22 RX ORDER — NALBUPHINE HCL 10 MG/ML
2.5 AMPUL (ML) INJECTION EVERY 4 HOURS PRN
Status: DISCONTINUED | OUTPATIENT
Start: 2023-02-22 | End: 2023-02-25

## 2023-02-22 RX ORDER — BISACODYL 10 MG
10 SUPPOSITORY, RECTAL RECTAL ONCE AS NEEDED
Status: DISCONTINUED | OUTPATIENT
Start: 2023-02-22 | End: 2023-02-25

## 2023-02-22 RX ORDER — GABAPENTIN 300 MG/1
300 CAPSULE ORAL EVERY 8 HOURS PRN
Status: DISCONTINUED | OUTPATIENT
Start: 2023-02-22 | End: 2023-02-25

## 2023-02-22 RX ORDER — SODIUM CHLORIDE, SODIUM LACTATE, POTASSIUM CHLORIDE, CALCIUM CHLORIDE 600; 310; 30; 20 MG/100ML; MG/100ML; MG/100ML; MG/100ML
125 INJECTION, SOLUTION INTRAVENOUS CONTINUOUS
Status: DISCONTINUED | OUTPATIENT
Start: 2023-02-22 | End: 2023-02-22 | Stop reason: HOSPADM

## 2023-02-22 RX ORDER — SODIUM CHLORIDE, SODIUM LACTATE, POTASSIUM CHLORIDE, CALCIUM CHLORIDE 600; 310; 30; 20 MG/100ML; MG/100ML; MG/100ML; MG/100ML
INJECTION, SOLUTION INTRAVENOUS CONTINUOUS
Status: DISCONTINUED | OUTPATIENT
Start: 2023-02-22 | End: 2023-02-25

## 2023-02-22 RX ORDER — ONDANSETRON 2 MG/ML
4 INJECTION INTRAMUSCULAR; INTRAVENOUS EVERY 6 HOURS PRN
Status: DISCONTINUED | OUTPATIENT
Start: 2023-02-22 | End: 2023-02-22 | Stop reason: HOSPADM

## 2023-02-22 RX ORDER — DIPHENHYDRAMINE HYDROCHLORIDE 50 MG/ML
25 INJECTION INTRAMUSCULAR; INTRAVENOUS ONCE AS NEEDED
Status: DISCONTINUED | OUTPATIENT
Start: 2023-02-22 | End: 2023-02-22 | Stop reason: HOSPADM

## 2023-02-22 RX ORDER — KETOROLAC TROMETHAMINE 30 MG/ML
INJECTION, SOLUTION INTRAMUSCULAR; INTRAVENOUS
Status: COMPLETED
Start: 2023-02-22 | End: 2023-02-22

## 2023-02-22 RX ORDER — ONDANSETRON 2 MG/ML
INJECTION INTRAMUSCULAR; INTRAVENOUS AS NEEDED
Status: DISCONTINUED | OUTPATIENT
Start: 2023-02-22 | End: 2023-02-22 | Stop reason: SURG

## 2023-02-22 RX ORDER — HYDROMORPHONE HYDROCHLORIDE 1 MG/ML
0.3 INJECTION, SOLUTION INTRAMUSCULAR; INTRAVENOUS; SUBCUTANEOUS EVERY 2 HOUR PRN
Status: DISCONTINUED | OUTPATIENT
Start: 2023-02-22 | End: 2023-02-25

## 2023-02-22 RX ORDER — DEXAMETHASONE SODIUM PHOSPHATE 4 MG/ML
VIAL (ML) INJECTION AS NEEDED
Status: DISCONTINUED | OUTPATIENT
Start: 2023-02-22 | End: 2023-02-22 | Stop reason: SURG

## 2023-02-22 RX ORDER — NALOXONE HYDROCHLORIDE 0.4 MG/ML
0.08 INJECTION, SOLUTION INTRAMUSCULAR; INTRAVENOUS; SUBCUTANEOUS
Status: ACTIVE | OUTPATIENT
Start: 2023-02-22 | End: 2023-02-23

## 2023-02-22 RX ORDER — HYDROMORPHONE HYDROCHLORIDE 1 MG/ML
0.4 INJECTION, SOLUTION INTRAMUSCULAR; INTRAVENOUS; SUBCUTANEOUS EVERY 2 HOUR PRN
Status: ACTIVE | OUTPATIENT
Start: 2023-02-22 | End: 2023-02-23

## 2023-02-22 RX ORDER — SIMETHICONE 80 MG
80 TABLET,CHEWABLE ORAL 3 TIMES DAILY PRN
Status: DISCONTINUED | OUTPATIENT
Start: 2023-02-22 | End: 2023-02-25

## 2023-02-22 RX ORDER — DIPHENHYDRAMINE HCL 25 MG
25 CAPSULE ORAL EVERY 4 HOURS PRN
Status: DISCONTINUED | OUTPATIENT
Start: 2023-02-22 | End: 2023-02-25

## 2023-02-22 RX ORDER — MORPHINE SULFATE 2 MG/ML
INJECTION, SOLUTION INTRAMUSCULAR; INTRAVENOUS AS NEEDED
Status: DISCONTINUED | OUTPATIENT
Start: 2023-02-22 | End: 2023-02-22 | Stop reason: SURG

## 2023-02-22 RX ORDER — HYDROMORPHONE HYDROCHLORIDE 1 MG/ML
0.4 INJECTION, SOLUTION INTRAMUSCULAR; INTRAVENOUS; SUBCUTANEOUS EVERY 5 MIN PRN
Status: DISCONTINUED | OUTPATIENT
Start: 2023-02-22 | End: 2023-02-22 | Stop reason: HOSPADM

## 2023-02-22 RX ORDER — DIPHENHYDRAMINE HYDROCHLORIDE 50 MG/ML
12.5 INJECTION INTRAMUSCULAR; INTRAVENOUS EVERY 4 HOURS PRN
Status: DISCONTINUED | OUTPATIENT
Start: 2023-02-22 | End: 2023-02-25

## 2023-02-22 RX ORDER — KETOROLAC TROMETHAMINE 30 MG/ML
30 INJECTION, SOLUTION INTRAMUSCULAR; INTRAVENOUS EVERY 6 HOURS
Status: DISPENSED | OUTPATIENT
Start: 2023-02-22 | End: 2023-02-23

## 2023-02-22 RX ORDER — METOCLOPRAMIDE HYDROCHLORIDE 5 MG/ML
10 INJECTION INTRAMUSCULAR; INTRAVENOUS EVERY 6 HOURS PRN
Status: DISCONTINUED | OUTPATIENT
Start: 2023-02-22 | End: 2023-02-25

## 2023-02-22 RX ORDER — IBUPROFEN 600 MG/1
600 TABLET ORAL EVERY 6 HOURS
Status: DISCONTINUED | OUTPATIENT
Start: 2023-02-23 | End: 2023-02-25

## 2023-02-22 RX ORDER — DEXTROSE, SODIUM CHLORIDE, SODIUM LACTATE, POTASSIUM CHLORIDE, AND CALCIUM CHLORIDE 5; .6; .31; .03; .02 G/100ML; G/100ML; G/100ML; G/100ML; G/100ML
INJECTION, SOLUTION INTRAVENOUS CONTINUOUS PRN
Status: DISCONTINUED | OUTPATIENT
Start: 2023-02-22 | End: 2023-02-25

## 2023-02-22 RX ADMIN — PHENYLEPHRINE HCL 100 MCG: 10 MG/ML VIAL (ML) INJECTION at 10:48:00

## 2023-02-22 RX ADMIN — ONDANSETRON 4 MG: 2 INJECTION INTRAMUSCULAR; INTRAVENOUS at 11:00:00

## 2023-02-22 RX ADMIN — BUPIVACAINE HYDROCHLORIDE 1.5 ML: 7.5 INJECTION, SOLUTION INTRASPINAL at 10:42:00

## 2023-02-22 RX ADMIN — DEXAMETHASONE SODIUM PHOSPHATE 4 MG: 4 MG/ML VIAL (ML) INJECTION at 10:51:00

## 2023-02-22 RX ADMIN — PHENYLEPHRINE HCL 100 MCG: 10 MG/ML VIAL (ML) INJECTION at 11:00:00

## 2023-02-22 RX ADMIN — CEFAZOLIN SODIUM/WATER 2 G: 2 G/20 ML SYRINGE (ML) INTRAVENOUS at 10:45:00

## 2023-02-22 RX ADMIN — MORPHINE SULFATE 0.2 MG: 2 INJECTION, SOLUTION INTRAMUSCULAR; INTRAVENOUS at 10:42:00

## 2023-02-22 RX ADMIN — PHENYLEPHRINE HCL 100 MCG: 10 MG/ML VIAL (ML) INJECTION at 10:53:00

## 2023-02-22 RX ADMIN — PHENYLEPHRINE HCL 100 MCG: 10 MG/ML VIAL (ML) INJECTION at 11:05:00

## 2023-02-22 RX ADMIN — PHENYLEPHRINE HCL 100 MCG: 10 MG/ML VIAL (ML) INJECTION at 10:50:00

## 2023-02-22 RX ADMIN — SODIUM CHLORIDE, SODIUM LACTATE, POTASSIUM CHLORIDE, CALCIUM CHLORIDE: 600; 310; 30; 20 INJECTION, SOLUTION INTRAVENOUS at 10:38:00

## 2023-02-22 NOTE — PROGRESS NOTES
Pt admitted to room 1114 in stable condition. HUGS/KISSES in place. ID bands checked with Tea RN. Educational materials reviewed with Pt and placed at bedside.

## 2023-02-22 NOTE — PLAN OF CARE
Problem: BIRTH - VAGINAL/ SECTION  Goal: Fetal and maternal status remain reassuring during the birth process  Description: INTERVENTIONS:  - Monitor vital signs  - Monitor fetal heart rate  - Monitor uterine activity  - Monitor labor progression (vaginal delivery)  - DVT prophylaxis (C/S delivery)  - Surgical antibiotic prophylaxis (C/S delivery)  Outcome: Completed     Problem: ANXIETY  Goal: Will report anxiety at manageable levels  Description: INTERVENTIONS:  - Administer medication as ordered  - Teach and rehearse alternative coping skills  - Provide emotional support with 1:1 interaction with staff  Outcome: Completed     Problem: PAIN - ADULT  Goal: Verbalizes/displays adequate comfort level or patient's stated pain goal  Description: INTERVENTIONS:  - Encourage pt to monitor pain and request assistance  - Assess pain using appropriate pain scale  - Administer analgesics based on type and severity of pain and evaluate response  - Implement non-pharmacological measures as appropriate and evaluate response  - Consider cultural and social influences on pain and pain management  - Manage/alleviate anxiety  - Utilize distraction and/or relaxation techniques  - Monitor for opioid side effects  - Notify MD/LIP if interventions unsuccessful or patient reports new pain  - Anticipate increased pain with activity and pre-medicate as appropriate  Outcome: Completed

## 2023-02-22 NOTE — PLAN OF CARE
Problem: BIRTH - VAGINAL/ SECTION  Goal: Fetal and maternal status remain reassuring during the birth process  Description: INTERVENTIONS:  - Monitor vital signs  - Monitor fetal heart rate  - Monitor uterine activity  - Monitor labor progression (vaginal delivery)  - DVT prophylaxis (C/S delivery)  - Surgical antibiotic prophylaxis (C/S delivery)  [id number] by Elzbieta Rivas RN  Outcome: Progressing  2023 by Elzbieta Rivas RN  Outcome: Progressing     Problem: PAIN - ADULT  Goal: Verbalizes/displays adequate comfort level or patient's stated pain goal  Description: INTERVENTIONS:  - Encourage pt to monitor pain and request assistance  - Assess pain using appropriate pain scale  - Administer analgesics based on type and severity of pain and evaluate response  - Implement non-pharmacological measures as appropriate and evaluate response  - Consider cultural and social influences on pain and pain management  - Manage/alleviate anxiety  - Utilize distraction and/or relaxation techniques  - Monitor for opioid side effects  - Notify MD/LIP if interventions unsuccessful or patient reports new pain  - Anticipate increased pain with activity and pre-medicate as appropriate  2023 by Elzbieta Rivas RN  Outcome: Progressing  2023 by Elzbieta Rivas RN  Outcome: Progressing     Problem: ANXIETY  Goal: Will report anxiety at manageable levels  Description: INTERVENTIONS:  - Administer medication as ordered  - Teach and rehearse alternative coping skills  - Provide emotional support with 1:1 interaction with staff  2023 by Elzbieta Rivas RN  Outcome: Progressing  2023 by Elzbieta Rivas RN  Outcome: Progressing

## 2023-02-22 NOTE — ANESTHESIA POSTPROCEDURE EVALUATION
Brandy Patient Status:  Inpatient   Age/Gender 27year old female MRN [id number]   Location [address] [facility] Attending Valeria Melgoza MD   Hosp Day # 0 PCP None Pcp       Anesthesia Post-op Note     SECTION    Procedure Summary     Date: 23 Room / Location: [facility] L+D OR  [facility] L+D OR    Anesthesia Start: 1038 Anesthesia Stop: 1150    Procedure:  SECTION Diagnosis:     Surgeons: Jonathan Carson MD Anesthesiologist: Valdemar Collier MD    Anesthesia Type: spinal ASA Status: 2          Anesthesia Type: spinal    Vitals Value Taken Time   BP 99/55 23 1151   Temp 97.8 23 1151   Pulse 86 23 1151   Resp 14 23 1151   SpO2 100 23 1151       Patient Location: PACU    Anesthesia Type: spinal    Airway Patency: patent    Postop Pain Control: adequate    Mental Status: preanesthetic baseline    Nausea/Vomiting: none    Cardiopulmonary/Hydration status: stable euvolemic    Complications: no apparent anesthesia related complications    Postop vital signs: stable    Dental Exam: Unchanged from Preop    Patient to be discharged from PACU when criteria met.

## 2023-02-22 NOTE — ANESTHESIA PROCEDURE NOTES
Spinal Block    Date/Time: 2/22/2023 10:40 AM    Performed by: Jourdan Hussein MD  Authorized by: Jourdan Hussein MD      General Information and Staff    Start Time:  2/22/2023 10:40 AM  End Time:  2/22/2023 10:42 AM  Anesthesiologist:  Jourdan Hussein MD  Performed by:   Anesthesiologist  Patient Location:  OB  Site identification: surface landmarks    Preanesthetic Checklist: patient identified, IV checked, risks and benefits discussed, monitors and equipment checked, pre-op evaluation, timeout performed, anesthesia consent and sterile technique used      Procedure Details    Patient Position:  Sitting  Prep: ChloraPrep    Monitoring:  Cardiac monitor, heart rate and continuous pulse ox  Approach:  Midline  Location:  L3-4  Injection Technique:  Single-shot    Needle    Needle Type:  Sprotte  Needle Gauge:  24 G  Needle Length:  3.5 in    Assessment    Sensory Level:  T4  Events: clear CSF, CSF aspirated, well tolerated and blood negative      Additional Comments

## 2023-02-23 LAB
BASOPHILS # BLD AUTO: 0.02 X10(3) UL (ref 0–0.2)
BASOPHILS NFR BLD AUTO: 0.3 %
EOSINOPHIL # BLD AUTO: 0.02 X10(3) UL (ref 0–0.7)
EOSINOPHIL NFR BLD AUTO: 0.3 %
ERYTHROCYTE [DISTWIDTH] IN BLOOD BY AUTOMATED COUNT: 16.2 %
HCT VFR BLD AUTO: 31.8 %
HGB BLD-MCNC: 10.1 G/DL
IMM GRANULOCYTES # BLD AUTO: 0.27 X10(3) UL (ref 0–1)
IMM GRANULOCYTES NFR BLD: 4.1 %
LYMPHOCYTES # BLD AUTO: 0.83 X10(3) UL (ref 1–4)
LYMPHOCYTES NFR BLD AUTO: 12.5 %
MCH RBC QN AUTO: 27.7 PG (ref 26–34)
MCHC RBC AUTO-ENTMCNC: 31.8 G/DL (ref 31–37)
MCV RBC AUTO: 87.1 FL
MONOCYTES # BLD AUTO: 0.55 X10(3) UL (ref 0.1–1)
MONOCYTES NFR BLD AUTO: 8.3 %
MORPHOLOGY: NORMAL
NEUTROPHILS # BLD AUTO: 4.94 X10 (3) UL (ref 1.5–7.7)
NEUTROPHILS # BLD AUTO: 4.94 X10(3) UL (ref 1.5–7.7)
NEUTROPHILS NFR BLD AUTO: 74.5 %
PLATELET # BLD AUTO: 111 10(3)UL (ref 150–450)
RBC # BLD AUTO: 3.65 X10(6)UL
WBC # BLD AUTO: 6.6 X10(3) UL (ref 4–11)

## 2023-02-25 VITALS
DIASTOLIC BLOOD PRESSURE: 72 MMHG | BODY MASS INDEX: 30 KG/M2 | TEMPERATURE: 98 F | HEART RATE: 78 BPM | OXYGEN SATURATION: 98 % | WEIGHT: 157 LBS | RESPIRATION RATE: 16 BRPM | SYSTOLIC BLOOD PRESSURE: 102 MMHG

## 2023-02-25 RX ORDER — ACETAMINOPHEN 500 MG
500 TABLET ORAL EVERY 4 HOURS PRN
Qty: 60 TABLET | Refills: 0 | Status: SHIPPED | OUTPATIENT
Start: 2023-02-25

## 2023-02-25 RX ORDER — IBUPROFEN 600 MG/1
600 TABLET ORAL EVERY 6 HOURS
Qty: 60 TABLET | Refills: 0 | Status: SHIPPED | OUTPATIENT
Start: 2023-02-25

## 2023-02-25 RX ORDER — PSEUDOEPHEDRINE HCL 30 MG
100 TABLET ORAL 2 TIMES DAILY
Qty: 30 CAPSULE | Refills: 0 | Status: SHIPPED | OUTPATIENT
Start: 2023-02-25

## 2023-02-25 NOTE — PROGRESS NOTES
Discharge instructions reviewed with patient. All questions and concerns addressed. Patient verbalized understanding and agrees to plan of care. Patient stated ability to care for self and  at home. States intent to follow up with PEDS and OB as recommended.  securely in car seat for discharge. All belongings accounted for.

## 2023-02-28 ENCOUNTER — TELEPHONE (OUTPATIENT)
Dept: OBGYN UNIT | Facility: HOSPITAL | Age: 33
End: 2023-02-28

## 2023-02-28 NOTE — PROGRESS NOTES
Reviewed self and infant care w / mom, she verbalizes understanding of instructions reviewed. Encourage to follow up w/ MDs as directed and w/ questions/concerns. Has a slight cold, enc abd splinting when coughing.

## 2023-03-08 ENCOUNTER — POSTPARTUM (OUTPATIENT)
Dept: OBGYN CLINIC | Facility: CLINIC | Age: 33
End: 2023-03-08
Payer: COMMERCIAL

## 2023-03-08 ENCOUNTER — TELEPHONE (OUTPATIENT)
Dept: OBGYN CLINIC | Facility: CLINIC | Age: 33
End: 2023-03-08

## 2023-03-08 VITALS
WEIGHT: 136 LBS | DIASTOLIC BLOOD PRESSURE: 70 MMHG | HEART RATE: 64 BPM | BODY MASS INDEX: 25.68 KG/M2 | SYSTOLIC BLOOD PRESSURE: 118 MMHG | HEIGHT: 61 IN | RESPIRATION RATE: 18 BRPM

## 2023-03-08 PROCEDURE — 3074F SYST BP LT 130 MM HG: CPT | Performed by: NURSE PRACTITIONER

## 2023-03-08 PROCEDURE — 3078F DIAST BP <80 MM HG: CPT | Performed by: NURSE PRACTITIONER

## 2023-03-08 PROCEDURE — 3008F BODY MASS INDEX DOCD: CPT | Performed by: NURSE PRACTITIONER

## 2023-03-08 PROCEDURE — 99024 POSTOP FOLLOW-UP VISIT: CPT | Performed by: NURSE PRACTITIONER

## 2023-03-08 NOTE — TELEPHONE ENCOUNTER
Pt dropped off provider statement for [email or web address]. Pt aware of $15 fee. She will call back to make payment. Forms upfront until payment made.

## 2023-03-15 ENCOUNTER — MED REC SCAN ONLY (OUTPATIENT)
Dept: OBGYN CLINIC | Facility: CLINIC | Age: 33
End: 2023-03-15

## 2023-03-15 NOTE — TELEPHONE ENCOUNTER
Paperwork completed. [place] signed. Copy sent to scanning. Faxed to employer. Copy filed in [address].  Sent mcm to pt

## 2023-04-09 PROBLEM — R79.89 ELEVATED LFTS: Status: RESOLVED | Noted: 2022-11-26 | Resolved: 2023-04-09

## 2023-04-09 PROBLEM — Z86.32 HISTORY OF GESTATIONAL DIABETES: Status: ACTIVE | Noted: 2022-10-21

## 2023-04-09 PROBLEM — D50.9 IRON DEFICIENCY ANEMIA: Status: RESOLVED | Noted: 2022-10-14 | Resolved: 2023-04-09

## 2023-04-09 PROBLEM — Z98.891 PREVIOUS CESAREAN SECTION: Status: ACTIVE | Noted: 2023-02-22

## 2023-04-09 PROBLEM — Z86.32 HISTORY OF GESTATIONAL DIABETES: Status: RESOLVED | Noted: 2022-10-21 | Resolved: 2023-04-09

## 2023-04-09 PROBLEM — D50.9 MATERNAL IRON DEFICIENCY ANEMIA AFFECTING PREGNANCY IN SECOND TRIMESTER, ANTEPARTUM: Status: RESOLVED | Noted: 2022-10-21 | Resolved: 2023-04-09

## 2023-04-09 PROBLEM — O99.012 MATERNAL IRON DEFICIENCY ANEMIA AFFECTING PREGNANCY IN SECOND TRIMESTER, ANTEPARTUM (HCC): Status: RESOLVED | Noted: 2022-10-21 | Resolved: 2023-04-09

## 2023-04-09 PROBLEM — D50.9 MATERNAL IRON DEFICIENCY ANEMIA AFFECTING PREGNANCY IN SECOND TRIMESTER, ANTEPARTUM (HCC): Status: RESOLVED | Noted: 2022-10-21 | Resolved: 2023-04-09

## 2023-04-09 PROBLEM — Z98.891 HISTORY OF CESAREAN DELIVERY: Status: RESOLVED | Noted: 2022-11-23 | Resolved: 2023-04-09

## 2023-04-09 PROBLEM — O99.012 MATERNAL IRON DEFICIENCY ANEMIA AFFECTING PREGNANCY IN SECOND TRIMESTER, ANTEPARTUM: Status: RESOLVED | Noted: 2022-10-21 | Resolved: 2023-04-09

## 2023-04-10 ENCOUNTER — POSTPARTUM (OUTPATIENT)
Dept: OBGYN CLINIC | Facility: CLINIC | Age: 33
End: 2023-04-10
Payer: COMMERCIAL

## 2023-04-10 VITALS
HEIGHT: 61 IN | DIASTOLIC BLOOD PRESSURE: 62 MMHG | HEART RATE: 69 BPM | BODY MASS INDEX: 26.29 KG/M2 | SYSTOLIC BLOOD PRESSURE: 104 MMHG | WEIGHT: 139.25 LBS

## 2023-04-10 DIAGNOSIS — Z12.4 SCREENING FOR CERVICAL CANCER: Primary | ICD-10-CM

## 2023-04-10 PROCEDURE — 87624 HPV HI-RISK TYP POOLED RSLT: CPT | Performed by: OBSTETRICS & GYNECOLOGY

## 2023-04-11 LAB — HPV I/H RISK 1 DNA SPEC QL NAA+PROBE: NEGATIVE

## 2023-08-11 ENCOUNTER — OFFICE VISIT (OUTPATIENT)
Dept: OBGYN CLINIC | Facility: CLINIC | Age: 33
End: 2023-08-11
Payer: COMMERCIAL

## 2023-08-11 VITALS
SYSTOLIC BLOOD PRESSURE: 98 MMHG | DIASTOLIC BLOOD PRESSURE: 70 MMHG | BODY MASS INDEX: 25.3 KG/M2 | WEIGHT: 134 LBS | HEART RATE: 95 BPM | RESPIRATION RATE: 16 BRPM | HEIGHT: 61 IN

## 2023-08-11 DIAGNOSIS — M62.08 DIASTASIS OF RECTUS ABDOMINIS: Primary | ICD-10-CM

## 2023-08-11 PROCEDURE — 3078F DIAST BP <80 MM HG: CPT | Performed by: NURSE PRACTITIONER

## 2023-08-11 PROCEDURE — 99213 OFFICE O/P EST LOW 20 MIN: CPT | Performed by: NURSE PRACTITIONER

## 2023-08-11 PROCEDURE — 3074F SYST BP LT 130 MM HG: CPT | Performed by: NURSE PRACTITIONER

## 2023-08-11 PROCEDURE — 3008F BODY MASS INDEX DOCD: CPT | Performed by: NURSE PRACTITIONER

## 2023-08-11 NOTE — PROGRESS NOTES
Subjective:  26year old [id number]   Patient presents with: Other: Patient is 6 months postpartum. Abdomen still extended and not reducing. Requesting PT    Pt here today with complaints of bulging of abdomen  Pt notes that she is 6 months postpartum but still feels like she looks pregnant  Despite eating well and losing weight, pt feels that abdomen is large  Denies any other complaints    Has been seen by physical therapy and is requesting referral to PT for diastasis recti     Review of Systems:  Pertinent items are noted in the HPI. Objective:  BP 98/70 (BP Location: Left arm, Patient Position: Sitting, Cuff Size: adult)   Pulse 95   Resp 16   Ht 61\"   Wt 134 lb (60.8 kg)   LMP 08/02/2023 (Approximate)     Physical Examination:  General appearance: Well dressed, well nourished in no apparent distress  Neurologic/Psychiatric: Alert and oriented to person, place and time, mood normal, affect appropriate  Abdomen: + 3 finger width diastasis recti, Soft, non-tender, non-distended, no masses, no hepatosplenomegaly, no hernias, no inguinal lymphadenopathy    Assessment/Plan:      Diagnoses and all orders for this visit:    Diastasis of rectus abdominis  - dicussed treatment options including PT vs surgical repair  - pt desire PT so order placed      - OP REFERRAL TO [facility]  - to follow up with new/worsening symptoms or no improvement        Return if symptoms worsen or fail to improve.

## 2023-09-26 ENCOUNTER — OFFICE VISIT (OUTPATIENT)
Dept: PHYSICAL THERAPY | Age: 33
End: 2023-09-26
Attending: NURSE PRACTITIONER
Payer: COMMERCIAL

## 2023-09-26 DIAGNOSIS — M62.08 DIASTASIS OF RECTUS ABDOMINIS: Primary | ICD-10-CM

## 2023-09-26 PROCEDURE — 97110 THERAPEUTIC EXERCISES: CPT

## 2023-09-26 PROCEDURE — 97161 PT EVAL LOW COMPLEX 20 MIN: CPT

## 2023-09-28 ENCOUNTER — OFFICE VISIT (OUTPATIENT)
Dept: PHYSICAL THERAPY | Age: 33
End: 2023-09-28
Attending: NURSE PRACTITIONER
Payer: COMMERCIAL

## 2023-09-28 PROCEDURE — 97110 THERAPEUTIC EXERCISES: CPT

## 2023-09-28 NOTE — PROGRESS NOTES
Diagnosis:   Diastasis of rectus abdominis          Referring Provider: Lavinia Gregory  Date of Evaluation:    9/26/23    Precautions:  none  Next MD visit:   none scheduled  Date of Surgery: n/a   Insurance Primary/Secondary: [address] [place] [address] / N/A     # Auth Visits: 8            Subjective:  no pain , pt did like the tape, but after taking a bath she did state that it started to itch. We did discuss not taking baths with the tape donned. Has been doing the exercises    Pain: 0/10      Objective:  2  fingers sternum  3 above and below the naval today       Assessment:  pt was able to demonstrate her exercises today with  minimal cues for form and to contract the trA with her exercises. Goals:   Pt to demo 2 fingers below and above the naval in 3 months   Pt to report no protrusion with proper body mechanics and lifting techs in the abdominal area  Pt will progress to quad position and not have full TrA contraction     Plan:  bridge with add/abd  TrA contractions with PF next appt   Date: 9/28/2023  TX#: 2/8 Date:                 TX#: 3/ Date:                 TX#: 4/ Date:                 TX#: 5/ Date:    Tx#: 6/   Taped with KT DR Ashley Piña position GTB donkey kicks and   Fire hydrants  Wall moutain climber GTB 10 x 2   Walk outs GTB 5 x each side  Wall bridge and B /SL 10 x each     STM B post to anterior in prone 4 mins                             HEP:  see above     Charges: EX 3        Total Timed Treatment: 45 min  Total Treatment Time: 45 min

## 2023-10-05 ENCOUNTER — OFFICE VISIT (OUTPATIENT)
Dept: PHYSICAL THERAPY | Age: 33
End: 2023-10-05
Attending: NURSE PRACTITIONER
Payer: COMMERCIAL

## 2023-10-05 PROCEDURE — 97110 THERAPEUTIC EXERCISES: CPT

## 2023-10-05 NOTE — PROGRESS NOTES
Diagnosis:   Diastasis of rectus abdominis          Referring Provider: Agnes Irizarry  Date of Evaluation:    9/26/23    Precautions:  none  Next MD visit:   none scheduled  Date of Surgery: n/a   Insurance Primary/Secondary: [address] [place] [address] / N/A     # Auth Visits: 8            Subjective:   took tape off, became too itchy  Yes to internal exam today for LA strength - B     Pain: 0/10      Objective:  2  fingers sternum  3 above and below the naval today     2+/5  LA B         Assessment:  pt was able to demonstrate her exercises today with  minimal cues for form and to contract the trA with her exercises. Cues to not use the abdominals and breathe. Pt having a hard time telling if she was rustam. Will re assess next appt     Goals:   Pt to demo 2 fingers below and above the naval in 3 months   Pt to report no protrusion with proper body mechanics and lifting techs in the abdominal area  Pt will progress to quad position and not have full TrA contraction   Pt to demo 10 fast contractions in 10 sec for improved PF  Pt to demo 10 mv for 10 secs on bio feedback for improved PF     Plan:   cont with PF and core strengthening   Date: 9/28/2023  TX#: 2/8 Date:           10/5/23      TX#: 3/8 Date:                 TX#: 4/ Date:                 TX#: 5/ Date:    Tx#: 6/   Taped with KT DR Nickie Araujo position GTB donkey kicks and   Fire hydrants  Wall moutain climber GTB 10 x 2   Walk outs GTB 5 x each side  Wall bridge and B /SL 10 x each     STM B post to anterior in prone 4 mins  4 sec holds 10 x  4 fast 10 sets  Bent leg fall outs  10  x  Hip abd red band 10 x   Add with PF contractions 10 x                                HEP:  see above     Charges: EX 3        Total Timed Treatment: 40 min  Total Treatment Time: 40 min

## 2023-10-12 ENCOUNTER — OFFICE VISIT (OUTPATIENT)
Dept: PHYSICAL THERAPY | Age: 33
End: 2023-10-12
Attending: NURSE PRACTITIONER
Payer: COMMERCIAL

## 2023-10-12 PROCEDURE — 97110 THERAPEUTIC EXERCISES: CPT

## 2023-10-12 NOTE — PROGRESS NOTES
Diagnosis:   Diastasis of rectus abdominis          Referring Provider: Brandon Rendon  Date of Evaluation:    9/26/23    Precautions:  none  Next MD visit:   none scheduled  Date of Surgery: n/a   Insurance Primary/Secondary: [address] [place] [address] / N/A     # Auth Visits: 8            Subjective:   took tape off, became too itchy  Yes to internal exam today for LA strength - B     Pain: 0/10      Objective:  2  fingers sternum  3 above and below the naval today     2+/5  LA B         Assessment:  cont to progress exercises incorporating the PF- cues to not hold her breath. Cues for form . Still maintaining a 3 finger separation above and below the naval   Goals:   Pt to demo 2 fingers below and above the naval in 3 months   Pt to report no protrusion with proper body mechanics and lifting techs in the abdominal area  Pt will progress to quad position and not have full TrA contraction   Pt to demo 10 fast contractions in 10 sec for improved PF  Pt to demo 10 mv for 10 secs on bio feedback for improved PF     Plan:   cont with PF and core strengthening - assess with bio feedback next appt   Date: 9/28/2023  TX#: 2/8 Date:           10/5/23      TX#: 3/8 Date: 10/13/23  TX#: 4/8 Date:                 TX#: 5/ Date:    Tx#: 6/   Taped with JUANA Franklin Doctor position GTB donkey kicks and   Fire hydrants  Wall moutain climber GTB 10 x 2   Walk outs GTB 5 x each side  Wall bridge and B /SL 10 x each     STM B post to anterior in prone 4 mins  4 sec holds 10 x  4 fast 10 sets  Bent leg fall outs  10  x  Hip abd red band 10 x   Add with PF contractions 10 x      STM prone to help shift tissue forward  Ball add with bridge and PF 10 x     Abd with band and PF contractions  Clams GTB 10 x with PF contractions     Side walking with contractions GTB   Front walking with contractions 10 x                             HEP:  see above     Charges: EX 3        Total Timed Treatment: 45 min  Total Treatment Time: 45 min

## 2023-10-18 ENCOUNTER — OFFICE VISIT (OUTPATIENT)
Dept: PHYSICAL THERAPY | Age: 33
End: 2023-10-18
Attending: NURSE PRACTITIONER
Payer: COMMERCIAL

## 2023-10-18 PROCEDURE — 97110 THERAPEUTIC EXERCISES: CPT

## 2023-10-18 NOTE — PROGRESS NOTES
Diagnosis:   Diastasis of rectus abdominis          Referring Provider: Chance Thakkar  Date of Evaluation:    9/26/23    Precautions:  none  Next MD visit:   none scheduled  Date of Surgery: n/a   Insurance Primary/Secondary: [address] [place] [address] / N/A     # Auth Visits: 8            Subjective:    pt is feeling stronger   Yes to internal exam today for LA strength - B     Pain: 0/10      Objective:  2  fingers sternum  3 above 2.5  below the naval today     2+/5  LA B         Assessment:  bio feedback not working today- will re attempt next appt. Pt fatigued with walk outs and side knee planks, cues for form. Pt asked about biking , has been using a belt, advised to contract herself and to stop using the belt at this time. Goals:   Pt to demo 2 fingers below and above the naval in 3 months   Pt to report no protrusion with proper body mechanics and lifting techs in the abdominal area  Pt will progress to quad position and not have full TrA contraction   Pt to demo 10 fast contractions in 10 sec for improved PF  Pt to demo 10 mv for 10 secs on bio feedback for improved PF     Plan:   cont with PF and core strengthening - assess with bio feedback next appt   Date: 9/28/2023  TX#: 2/8 Date:           10/5/23      TX#: 3/8 Date: 10/13/23  TX#: 4/8 Date:  10/18/23               TX#: 5/8 Date:    Tx#: 6/   Taped with KT DR Damian Dandy position GTB donkey kicks and   Fire hydrants  Wall moutain climber GTB 10 x 2   Walk outs GTB 5 x each side  Wall bridge and B /SL 10 x each     STM B post to anterior in prone 4 mins  4 sec holds 10 x  4 fast 10 sets  Bent leg fall outs  10  x  Hip abd red band 10 x   Add with PF contractions 10 x      STM prone to help shift tissue forward  Ball add with bridge and PF 10 x     Abd with band and PF contractions  Clams GTB 10 x with PF contractions     Side walking with contractions GTB   Front walking with contractions 10 x    Hip add with ball 10 x   PF    Hip abd band with PF 10 x   Marching with GTB 10 x   In bridge  3 x 10   Walk outs with band for mult 10 x   Each side GTB   Squat on bosu 10 x 3  Reformer 5 cords 10  x 2   Side planks  10 secs on knees 3 x   Hip abd 10 x in side plank 10 x                              HEP:  see above     Charges: EX 2        Total Timed Treatment: 35 min  Total Treatment Time: 35 min  pt late and machine not working

## 2023-10-24 ENCOUNTER — OFFICE VISIT (OUTPATIENT)
Dept: PHYSICAL THERAPY | Age: 33
End: 2023-10-24
Attending: NURSE PRACTITIONER

## 2023-10-24 PROCEDURE — 97110 THERAPEUTIC EXERCISES: CPT

## 2023-10-24 NOTE — PROGRESS NOTES
Diagnosis:   Diastasis of rectus abdominis          Referring Provider: Luca Erickson  Date of Evaluation:    9/26/23    Precautions:  none  Next MD visit:   none scheduled  Date of Surgery: n/a   Insurance Primary/Secondary: [employer] Curry / N/A     # Auth Visits: 8            Subjective:   pt only missed two days of exercises. Pain: 0/10      Objective:  2  fingers sternum  3 above 2.0  below the naval today     2+/5  LA B         Assessment:  pt is able to feel like she can contract slightly stronger at this time. Still hard to tell in standing   Continued 3 fingers above naval today with assessment. Review of 90/90 in hook lying - compression above naval with the exercise. Goals:   Pt to demo 2 fingers below and above the naval in 3 months   Pt to report no protrusion with proper body mechanics and lifting techs in the abdominal area  Pt will progress to quad position and not have full TrA contraction   Pt to demo 10 fast contractions in 10 sec for improved PF  Pt to demo 10 mv for 10 secs on bio feedback for improved PF     Plan:    bio feedback next appt - will see her once she returns from [facility].  Gone for one month   Date: 9/28/2023  TX#: 2/8 Date:           10/5/23      TX#: 3/8 Date: 10/13/23  TX#: 4/8 Date:  10/18/23               TX#: 5/8 Date: 10/24/23  Tx#: 6/8   Taped with JUANA Valentin Dose position GTB donkey kicks and   Fire hydrants  Wall moutain climber GTB 10 x 2   Walk outs GTB 5 x each side  Wall bridge and B /SL 10 x each     STM B post to anterior in prone 4 mins  4 sec holds 10 x  4 fast 10 sets  Bent leg fall outs  10  x  Hip abd red band 10 x   Add with PF contractions 10 x      STM prone to help shift tissue forward  Ball add with bridge and PF 10 x     Abd with band and PF contractions  Clams GTB 10 x with PF contractions     Side walking with contractions GTB   Front walking with contractions 10 x    Hip add with ball 10 x   PF    Hip abd band with PF 10 x   Marching with GTB 10 x   In bridge  3 x 10   Walk outs with band for mult 10 x   Each side GTB   Squat on bosu 10 x 3  Reformer 5 cords 10  x 2   Side planks  10 secs on knees 3 x   Hip abd 10 x in side plank 10 x      Bridge 10 x with PF and ball add     GTB 10 x with PF   90/90 with marching 10 x 2    Gliders 10 x lateral with PF contractions   Post 10 x each side   Bird dog 5 sec holds    Vacuum breathing  Quad breathing 5 x 10 sec holds     Abd with orange cord 10 x each side                           HEP:  see above     Charges: EX 2        Total Timed Treatment: 35 min  Total Treatment Time: 35 min  pt late and machine not working

## 2023-12-18 ENCOUNTER — TELEPHONE (OUTPATIENT)
Dept: PHYSICAL THERAPY | Facility: HOSPITAL | Age: 33
End: 2023-12-18

## 2024-07-12 ENCOUNTER — OFFICE VISIT (OUTPATIENT)
Dept: FAMILY MEDICINE CLINIC | Facility: CLINIC | Age: 34
End: 2024-07-12
Payer: COMMERCIAL

## 2024-07-12 VITALS
SYSTOLIC BLOOD PRESSURE: 100 MMHG | TEMPERATURE: 98 F | HEART RATE: 74 BPM | RESPIRATION RATE: 16 BRPM | BODY MASS INDEX: 23.43 KG/M2 | WEIGHT: 124.13 LBS | DIASTOLIC BLOOD PRESSURE: 60 MMHG | HEIGHT: 61 IN | OXYGEN SATURATION: 98 %

## 2024-07-12 DIAGNOSIS — K64.4 EXTERNAL HEMORRHOIDS: Primary | ICD-10-CM

## 2024-07-12 PROCEDURE — 99203 OFFICE O/P NEW LOW 30 MIN: CPT | Performed by: STUDENT IN AN ORGANIZED HEALTH CARE EDUCATION/TRAINING PROGRAM

## 2024-07-12 PROCEDURE — 3078F DIAST BP <80 MM HG: CPT | Performed by: STUDENT IN AN ORGANIZED HEALTH CARE EDUCATION/TRAINING PROGRAM

## 2024-07-12 PROCEDURE — 3008F BODY MASS INDEX DOCD: CPT | Performed by: STUDENT IN AN ORGANIZED HEALTH CARE EDUCATION/TRAINING PROGRAM

## 2024-07-12 PROCEDURE — 3074F SYST BP LT 130 MM HG: CPT | Performed by: STUDENT IN AN ORGANIZED HEALTH CARE EDUCATION/TRAINING PROGRAM

## 2024-07-12 RX ORDER — LIDOCAINE HYDROCHLORIDE AND HYDROCORTISONE ACETATE 30; 5 MG/G; MG/G
CREAM RECTAL
COMMUNITY
Start: 2024-07-03

## 2024-07-12 NOTE — PROGRESS NOTES
Subjective:      Chief Complaint   Patient presents with    Hemorrhoids     States went to  last week and was given an ointment - states she is not having pain only swelling today      HISTORY OF PRESENT ILLNESS  HPI  HPI obtained per patient report.  Conrad Joy is a pleasant 34 year old female presenting with hemorrhoids.   She states that she went to urgent care last week due to painful and swollen external hemorrhoids. She was given a lidocaine-hydrocortisone ointment at the time. Her symptoms have been improving since her urgent care visit. She denies associated bleeding.   She did have constipation until a few days ago but this has improved.    PAST PATIENT HISTORY  Past Medical History:    Carrier of spinal muscular atrophy    declined testing for partner    Elevated hemoglobin A1c    A1c 5.7% at 8 wk gestation through [facility] on 22    Gestational diabetes mellitus (GDM) in first trimester (HCC)    A1c 5.7 (H) at 8 wk gestation. Failed early 1 hr  at 12 wk gestation. Failed early 3 hr GTT at 13 wk with 2 abnormal values. Patient refuses to accept result.     Iron deficiency anemia     - Hb 8.4. MCV 72, Ferritin 3 ([facility]) at 8 wk gestation. Normal hemoglobin electrophoresis    Screening for cystic fibrosis    negative for CF mutation    Screening for genetic disease carrier status    Normal hemoglobin electrophoresis     Vitamin B12 deficiency    2019 Vit B12 169 (L). Normal folate level.     Vitamin D deficiency    2019 Vit D level 12.6 (L)      Past Surgical History:   Procedure Laterality Date          arrest of descent       CURRENT MEDICATIONS  Outpatient Medications Marked as Taking for the 24 encounter (Office Visit) with Dieudonne Priest MD   Medication Sig Dispense Refill    phenylephrine-min oil-lina 0.25-14-74.9 % Rectal Ointment Apply topically twice daily as needed for hemorrhoids 28 g 0       HEALTH MAINTENANCE  Immunization History   Administered  Date(s) Administered    Covid-19 Vaccine Moderna 100 mcg/0.5 ml 04/30/2021, 05/28/2021    FLUZONE 6 months and older PFS 0.5 ml (72451) 08/19/2021    MMR 08/12/2016    TDAP 08/01/2016, 12/07/2022       ALLERGIES AND DRUG REACTIONS  No Known Allergies    History reviewed. No pertinent family history.  Social History     Socioeconomic History    Marital status:    Tobacco Use    Smoking status: Never    Smokeless tobacco: Never   Vaping Use    Vaping status: Never Used   Substance and Sexual Activity    Alcohol use: Not Currently    Drug use: Never    Sexual activity: Not Currently     Partners: Male   Other Topics Concern    Caffeine Concern No    Exercise No    Seat Belt Yes     Social Determinants of Health     Financial Resource Strain: Low Risk  (2/22/2023)    Financial Resource Strain     Difficulty of Paying Living Expenses: Not hard at all     Med Affordability: No   Food Insecurity: No Food Insecurity (2/22/2023)    Food Insecurity     Food Insecurity: Never true   Transportation Needs: No Transportation Needs (2/22/2023)    Transportation Needs     Lack of Transportation: No   Stress: No Stress Concern Present (2/22/2023)    Stress     Feeling of Stress : No   Housing Stability: Low Risk  (2/22/2023)    Housing Stability     Housing Instability: No       Review of Systems   All other systems reviewed and are negative.         Objective:      /60   Pulse 74   Temp 97.5 °F (36.4 °C) (Temporal)   Resp 16   Ht 5' 1\" (1.549 m)   Wt 124 lb 2 oz (56.3 kg)   LMP 07/08/2024 (Exact Date)   SpO2 98%   BMI 23.45 kg/m²   Body mass index is 23.45 kg/m².    Physical Exam  Vitals reviewed.   Constitutional:       General: She is not in acute distress.     Appearance: She is not ill-appearing or toxic-appearing.   HENT:      Head: Normocephalic and atraumatic.   Eyes:      General: No scleral icterus.        Right eye: No discharge.         Left eye: No discharge.      Conjunctiva/sclera: Conjunctivae  normal.   Cardiovascular:      Rate and Rhythm: Normal rate.   Pulmonary:      Effort: Pulmonary effort is normal.   Abdominal:      General: Abdomen is flat.   Genitourinary:     Rectum: External hemorrhoid (moderately firm, mildly tender, and edematous external hemorrhoid 10x5mm at 6 o'clock position. no associated erythema/bleeding/fluctuance) present. Normal anal tone.   Musculoskeletal:      Cervical back: Neck supple.   Neurological:      Mental Status: She is alert and oriented to person, place, and time.            Assessment and Plan:      1. External hemorrhoids (Primary)  -     SURGERY - INTERNAL  -     Phenylephrine-Mineral Oil-Pet; Apply topically twice daily as needed for hemorrhoids  Dispense: 28 g; Refill: 0    Return if symptoms worsen or fail to improve.    - thrombosed external hemorrhoid  - improving  - recommended sitz baths and phenylephrine ointment as written  - continue lidocaine-hydrocortisone ointment as needed for up to 14 days  - recommended increasing water and fiber intake to help prevent constipation  - if symptoms do not improve within the next 1-2 weeks, recommended consultation with surgery for further management    Patient verbalized understanding of assessment and recommendations. All questions and concerns were addressed.    Electronically signed by Dieudonne Priest MD

## 2024-12-03 ENCOUNTER — TELEPHONE (OUTPATIENT)
Dept: FAMILY MEDICINE CLINIC | Facility: CLINIC | Age: 34
End: 2024-12-03

## 2024-12-03 NOTE — TELEPHONE ENCOUNTER
Pts spouse calling stating that patient needs a letter for work stating that she is not able to travel to work every day due to her nausea and hemidrosis.     Pts spouse also states that she is traveling to [place] on Monday and needs to submit such letter to her employer before Monday.     Please advise.

## 2024-12-03 NOTE — TELEPHONE ENCOUNTER
We have not evaluated her for these symptoms. Would recommend appt for eval, but we cannot guarantee such a letter

## 2024-12-05 ENCOUNTER — TELEMEDICINE (OUTPATIENT)
Dept: FAMILY MEDICINE CLINIC | Facility: CLINIC | Age: 34
End: 2024-12-05
Payer: COMMERCIAL

## 2024-12-05 DIAGNOSIS — D50.9 IRON DEFICIENCY ANEMIA, UNSPECIFIED IRON DEFICIENCY ANEMIA TYPE: ICD-10-CM

## 2024-12-05 DIAGNOSIS — E55.9 VITAMIN D DEFICIENCY: ICD-10-CM

## 2024-12-05 DIAGNOSIS — R11.2 NAUSEA AND VOMITING, UNSPECIFIED VOMITING TYPE: Primary | ICD-10-CM

## 2024-12-05 DIAGNOSIS — E53.8 VITAMIN B12 DEFICIENCY: ICD-10-CM

## 2024-12-05 DIAGNOSIS — M62.08 DIASTASIS RECTI: ICD-10-CM

## 2024-12-05 DIAGNOSIS — R73.03 PREDIABETES: ICD-10-CM

## 2024-12-05 DIAGNOSIS — Z13.220 SCREENING CHOLESTEROL LEVEL: ICD-10-CM

## 2024-12-05 PROCEDURE — 99214 OFFICE O/P EST MOD 30 MIN: CPT | Performed by: STUDENT IN AN ORGANIZED HEALTH CARE EDUCATION/TRAINING PROGRAM

## 2024-12-05 RX ORDER — ONDANSETRON 4 MG/1
4 TABLET, FILM COATED ORAL EVERY 8 HOURS PRN
Qty: 20 TABLET | Refills: 1 | Status: SHIPPED | OUTPATIENT
Start: 2024-12-05

## 2024-12-05 NOTE — PROGRESS NOTES
Subjective:      No chief complaint on file.    HISTORY OF PRESENT ILLNESS  HPI  HPI obtained per patient report.  Conrad Joy is a pleasant 34 year old female presenting virtually to discuss nausea and vomiting.     She reports nausea and vomiting associated with commuting to work since her delivery and diagnosis with diastasis recti in 2023 and requests a letter to request to work from home 3/5 workdays per week.       PAST PATIENT HISTORY  Past Medical History:    Carrier of spinal muscular atrophy    declined testing for partner    Elevated hemoglobin A1c    A1c 5.7% at 8 wk gestation through [facility] on 22    Gestational diabetes mellitus (GDM) in first trimester (HCC)    A1c 5.7 (H) at 8 wk gestation. Failed early 1 hr  at 12 wk gestation. Failed early 3 hr GTT at 13 wk with 2 abnormal values. Patient refuses to accept result.     Iron deficiency anemia     - Hb 8.4. MCV 72, Ferritin 3 ([facility]) at 8 wk gestation. Normal hemoglobin electrophoresis    Screening for cystic fibrosis    negative for CF mutation    Screening for genetic disease carrier status    Normal hemoglobin electrophoresis     Vitamin B12 deficiency    2019 Vit B12 169 (L). Normal folate level.     Vitamin D deficiency    2019 Vit D level 12.6 (L)      Past Surgical History:   Procedure Laterality Date          arrest of descent       CURRENT MEDICATIONS  Medications Taking[1]    HEALTH MAINTENANCE  Immunization History   Administered Date(s) Administered    Covid-19 Vaccine Moderna 100 mcg/0.5 ml 2021, 2021    FLUZONE 6 months and older PFS 0.5 ml (10147) 2021    MMR 2016    TDAP 2016, 2022       ALLERGIES AND DRUG REACTIONS  Allergies[2]    No family history on file.  Social History     Socioeconomic History    Marital status:    Tobacco Use    Smoking status: Never    Smokeless tobacco: Never   Vaping Use    Vaping status: Never Used   Substance and Sexual  Activity    Alcohol use: Not Currently    Drug use: Never    Sexual activity: Not Currently     Partners: Male   Other Topics Concern    Caffeine Concern No    Exercise No    Seat Belt Yes     Social Drivers of Health     Financial Resource Strain: Low Risk  (2/22/2023)    Financial Resource Strain     Difficulty of Paying Living Expenses: Not hard at all     Med Affordability: No   Food Insecurity: No Food Insecurity (2/22/2023)    Food Insecurity     Food Insecurity: Never true   Transportation Needs: No Transportation Needs (2/22/2023)    Transportation Needs     Lack of Transportation: No   Stress: No Stress Concern Present (2/22/2023)    Stress     Feeling of Stress : No   Housing Stability: Low Risk  (2/22/2023)    Housing Stability     Housing Instability: No       Review of Systems   Gastrointestinal:  Positive for nausea and vomiting.   All other systems reviewed and are negative.         Objective:      LMP 07/08/2024 (Exact Date)   There is no height or weight on file to calculate BMI.    Physical Exam  Constitutional:       General: She is not in acute distress.     Appearance: She is not ill-appearing or toxic-appearing.   Pulmonary:      Effort: Pulmonary effort is normal.   Musculoskeletal:      Cervical back: Neck supple.   Neurological:      Mental Status: She is alert and oriented to person, place, and time.   Psychiatric:         Mood and Affect: Mood normal.            Assessment and Plan:      1. Nausea and vomiting, unspecified vomiting type (Primary)  -     US ABDOMEN COMPLETE (CPT=76700); Future; Expected date: 12/05/2024  -     Ondansetron HCl; Take 1 tablet (4 mg total) by mouth every 8 (eight) hours as needed for Nausea.  Dispense: 20 tablet; Refill: 1  2. Diastasis recti  3. Screening cholesterol level  -     Lipid Panel; Future; Expected date: 12/05/2024  4. Prediabetes  -     Comp Metabolic Panel (14); Future; Expected date: 12/05/2024  -     Hemoglobin A1C; Future; Expected date:  12/05/2024  -     TSH W Reflex To Free T4; Future; Expected date: 12/05/2024  5. Iron deficiency anemia, unspecified iron deficiency anemia type  -     CBC With Differential With Platelet; Future; Expected date: 12/05/2024  6. Vitamin D deficiency  Overview:  12/20/2019 Vit D level 12.6 (L)     Orders:  -     Vitamin D; Future; Expected date: 12/05/2024  7. Vitamin B12 deficiency  Overview:  12/20/2019 Vit B12 169 (L). Normal folate level.     Orders:  -     Vitamin B12; Future; Expected date: 12/05/2024    No follow-ups on file.    Nausea and vomiting  - chronic  - associated with commuting to and from work per pt report  - associated with timing of diagnosis of diastasis recti  - recommended labs and abdominal US for further evaluation  - we discussed that she may take zofran as needed while her evaluation is pending  - work accommodation letter sent to pt via My Chart per her request    Patient verbalized understanding of assessment and recommendations. All questions and concerns were addressed.    Telehealth appointment with video and audio was scheduled and completed with the patient's informed consent. Telehealth appointment took place in context of CDC social distancing guidelines during the Covid-19 pandemic.      Electronically signed by Dieudonne Priest MD       [1]   Outpatient Medications Marked as Taking for the 12/5/24 encounter (Telemedicine) with Dieudonne Priest MD   Medication Sig Dispense Refill    ondansetron (ZOFRAN) 4 mg tablet Take 1 tablet (4 mg total) by mouth every 8 (eight) hours as needed for Nausea. 20 tablet 1   [2] No Known Allergies

## 2025-01-09 ENCOUNTER — TELEPHONE (OUTPATIENT)
Dept: FAMILY MEDICINE CLINIC | Facility: CLINIC | Age: 35
End: 2025-01-09

## 2025-01-09 NOTE — TELEPHONE ENCOUNTER
Patient's spouse came into the office and dropped off accommodation request. No RELEASE OF INFORMATION, no fee collected. Patient is currently in [place].

## 2025-01-16 ENCOUNTER — TELEPHONE (OUTPATIENT)
Dept: FAMILY MEDICINE CLINIC | Facility: CLINIC | Age: 35
End: 2025-01-16

## 2025-01-16 NOTE — TELEPHONE ENCOUNTER
Patient's spouse said he dropped off forms regarding work accommodations, can't travel to [place], requesting status.

## 2025-01-16 NOTE — TELEPHONE ENCOUNTER
We can complete forms to request to work from home 3/5 workdays per week for 6 months. She has been ordered labs and imaging for further evaluation for her symptoms and will need to complete within this time frame

## 2025-01-16 NOTE — TELEPHONE ENCOUNTER
Patients spouse Hoyos calling to check to see if Americans with Disabilities Act form was received from the providers office; form received. Logged for processing and obtained details. Advised patient of T/A time 10-12 business days. Per spouse form needs to be completed by January 24th.   Patient requests forms to be sent via [employer] message.     Type of Leave: Americans with Disabilities Act   Reason for Leave: Looking to work remote due to nausea and vomiting    Start date of leave: 01/27/25   End date of leave: 01/26/26   How many flare ups per month/length?:  How many appts per month/length?:   Was Fee and Turnaround info Given?: Yes.

## 2025-01-16 NOTE — TELEPHONE ENCOUNTER
,    Patient requesting Americans with Disabilities Act due to nausea and vomiting. Patient requesting to work remote from 01/27/25 till 01/26/26.    Do you support?    Thank you,  Migue

## 2025-01-17 NOTE — TELEPHONE ENCOUNTER
Dr. Priest,    Please sign off on form if you agree to: Americans with Disabilities Act due to nausea and vomiting. Patient to work 3/5 days remote. Start date 01/27/25-07/27/25.    -Signature page will be the first page scanned  -From your Inbasket, Highlight the patient and click Chart   -Double click the 01/09/2025 Forms Completion telephone encounter  -Scroll down to the Media section   -Click the blue Hyperlink: Americans with Disabilities Act Dr Priest 01/17/25  -Click Acknowledge located in the top right ribbon/menu   -Drag the mouse into the blank space of the document and a + sign will appear. Left click to   electronically sign the document.  -Once signed, simply exit out of the screen and you signature will be saved.     Thank you,      Apryl

## 2025-01-17 NOTE — TELEPHONE ENCOUNTER
Called patient, unable to leave a message phone rings then goes busy. Please relay provider's message. Forms will be completed as approved by provider.

## 2025-01-20 ENCOUNTER — PATIENT MESSAGE (OUTPATIENT)
Dept: ADMINISTRATIVE | Age: 35
End: 2025-01-20

## 2025-01-20 ENCOUNTER — TELEPHONE (OUTPATIENT)
Dept: FAMILY MEDICINE CLINIC | Facility: CLINIC | Age: 35
End: 2025-01-20

## 2025-01-20 NOTE — TELEPHONE ENCOUNTER
Forms were received by:___Fax         ___MyChart         ___Dropped off         ___Mail    From:        Reason for Form Completion:      CHARLES Signed    __x_Yes       ___No    Fee Collected    _x__Yes       ___No    Emailed to self and Forms Department.  Sent inner office mail to Forms Department.       Patient's spouse picked up copy of forms completed.

## 2025-01-20 NOTE — TELEPHONE ENCOUNTER
Patients spouse Keyla calling to check the status on forms. Advised spouse forms were completed and sent via [employer] message. Spouse requesting for forms to be sent via email; advised patient due to HIPAA we are unable to email it to a private email. Advised patients spouse forms can be picked up at the providers office.

## 2025-01-20 NOTE — TELEPHONE ENCOUNTER
Current work accommodation request is only while her evaluation for her symptoms is pending. Any further extension is pending the results of her labs and imaging

## 2025-01-20 NOTE — TELEPHONE ENCOUNTER
Patient's spouse stopped in the office to ask if FAMILY MEDICAL LEAVE ACT can be extended for a full year?  The employer will only allow one request.

## 2025-01-20 NOTE — TELEPHONE ENCOUNTER
Patients Spouse Called to ask why was only 6 months approved when they requested 1 year. I explained Dr Priest only approved 6 months. Advise that patient will have to consult with provider. Patient Spouse expressed understanding.

## 2025-01-20 NOTE — TELEPHONE ENCOUNTER
Patient spouse Keyla (listed on verbal) calling in regards to the extension he rewuested for Americans with Disabilities Act. Let him know that it looks like a nurse from providers office sent patient a Mychart message this afternoon: message states that any extension from provider will be based off of results of labs and imaging needed. Orders placed today for labs and imaging. Advised to call back after labs and imaging are completed and they speak to provider regarding results.

## (undated) DEVICE — LARGE, DISPOSABLE ALEXIS O C-SECTION PROTECTOR - RETRACTOR: Brand: ALEXIS ® O C-SECTION PROTECTOR - RETRACTOR

## (undated) NOTE — LETTER
Date: 12/5/2024    Patient Name: Conrad Joy          To Whom it may concern:    This letter has been written at the patient's request. The above patient was seen at [facility] for treatment of a medical condition.    She reports that she experiences nausea and vomiting associated with commuting to and from work. She is being evaluated for these symptoms and has been prescribed a medication. Please allow for the patient to work from home 3 out of the 5 workdays per week for accommodation.         Sincerely,    Dieudonne Priest MD